# Patient Record
Sex: MALE | Race: WHITE | NOT HISPANIC OR LATINO | Employment: UNEMPLOYED | URBAN - METROPOLITAN AREA
[De-identification: names, ages, dates, MRNs, and addresses within clinical notes are randomized per-mention and may not be internally consistent; named-entity substitution may affect disease eponyms.]

---

## 2020-04-10 ENCOUNTER — HOSPITAL ENCOUNTER (OUTPATIENT)
Dept: RADIOLOGY | Facility: MEDICAL CENTER | Age: 30
End: 2020-04-10
Attending: STUDENT IN AN ORGANIZED HEALTH CARE EDUCATION/TRAINING PROGRAM
Payer: COMMERCIAL

## 2020-04-10 DIAGNOSIS — H51.22 INTERNUCLEAR OPHTHALMOPLEGIA OF LEFT EYE: ICD-10-CM

## 2020-04-10 PROCEDURE — A9576 INJ PROHANCE MULTIPACK: HCPCS | Performed by: STUDENT IN AN ORGANIZED HEALTH CARE EDUCATION/TRAINING PROGRAM

## 2020-04-10 PROCEDURE — 700117 HCHG RX CONTRAST REV CODE 255: Performed by: STUDENT IN AN ORGANIZED HEALTH CARE EDUCATION/TRAINING PROGRAM

## 2020-04-10 PROCEDURE — 70553 MRI BRAIN STEM W/O & W/DYE: CPT

## 2020-04-10 RX ADMIN — GADOTERIDOL 20 ML: 279.3 INJECTION, SOLUTION INTRAVENOUS at 08:20

## 2020-05-05 ENCOUNTER — HOSPITAL ENCOUNTER (OUTPATIENT)
Dept: RADIOLOGY | Facility: MEDICAL CENTER | Age: 30
End: 2020-05-05
Attending: PSYCHIATRY & NEUROLOGY
Payer: COMMERCIAL

## 2020-05-05 DIAGNOSIS — H51.22 INO (INTERNUCLEAR OPHTHALMOPLEGIA), LEFT: ICD-10-CM

## 2020-05-05 PROCEDURE — 700117 HCHG RX CONTRAST REV CODE 255: Performed by: PSYCHIATRY & NEUROLOGY

## 2020-05-05 PROCEDURE — 70543 MRI ORBT/FAC/NCK W/O &W/DYE: CPT

## 2020-05-05 PROCEDURE — A9576 INJ PROHANCE MULTIPACK: HCPCS | Performed by: PSYCHIATRY & NEUROLOGY

## 2020-05-05 RX ADMIN — GADOTERIDOL 20 ML: 279.3 INJECTION, SOLUTION INTRAVENOUS at 17:31

## 2020-05-06 ENCOUNTER — HOSPITAL ENCOUNTER (OUTPATIENT)
Dept: RADIOLOGY | Facility: MEDICAL CENTER | Age: 30
End: 2020-05-06
Attending: STUDENT IN AN ORGANIZED HEALTH CARE EDUCATION/TRAINING PROGRAM
Payer: COMMERCIAL

## 2020-05-06 DIAGNOSIS — R74.01 ELEVATED ALANINE AMINOTRANSFERASE (ALT) LEVEL: ICD-10-CM

## 2020-05-06 PROCEDURE — 76705 ECHO EXAM OF ABDOMEN: CPT

## 2020-08-12 ENCOUNTER — OUTPATIENT INFUSION SERVICES (OUTPATIENT)
Dept: ONCOLOGY | Facility: MEDICAL CENTER | Age: 30
End: 2020-08-12
Attending: PSYCHIATRY & NEUROLOGY
Payer: COMMERCIAL

## 2020-08-12 VITALS
BODY MASS INDEX: 32.22 KG/M2 | HEIGHT: 72 IN | DIASTOLIC BLOOD PRESSURE: 87 MMHG | RESPIRATION RATE: 18 BRPM | OXYGEN SATURATION: 96 % | WEIGHT: 237.88 LBS | TEMPERATURE: 98 F | SYSTOLIC BLOOD PRESSURE: 141 MMHG | HEART RATE: 96 BPM

## 2020-08-12 DIAGNOSIS — G40.909 NONINTRACTABLE EPILEPSY WITHOUT STATUS EPILEPTICUS, UNSPECIFIED EPILEPSY TYPE (HCC): ICD-10-CM

## 2020-08-12 PROCEDURE — 700105 HCHG RX REV CODE 258: Performed by: PSYCHIATRY & NEUROLOGY

## 2020-08-12 PROCEDURE — 700111 HCHG RX REV CODE 636 W/ 250 OVERRIDE (IP): Performed by: PSYCHIATRY & NEUROLOGY

## 2020-08-12 PROCEDURE — 96365 THER/PROPH/DIAG IV INF INIT: CPT

## 2020-08-12 RX ADMIN — SODIUM CHLORIDE 1000 MG: 9 INJECTION, SOLUTION INTRAVENOUS at 16:15

## 2020-08-13 ENCOUNTER — OUTPATIENT INFUSION SERVICES (OUTPATIENT)
Dept: ONCOLOGY | Facility: MEDICAL CENTER | Age: 30
End: 2020-08-13
Attending: PSYCHIATRY & NEUROLOGY
Payer: COMMERCIAL

## 2020-08-13 VITALS
HEIGHT: 72 IN | WEIGHT: 240.3 LBS | OXYGEN SATURATION: 97 % | SYSTOLIC BLOOD PRESSURE: 133 MMHG | DIASTOLIC BLOOD PRESSURE: 88 MMHG | TEMPERATURE: 98.1 F | HEART RATE: 97 BPM | BODY MASS INDEX: 32.55 KG/M2 | RESPIRATION RATE: 17 BRPM

## 2020-08-13 DIAGNOSIS — H51.22 INTERNUCLEAR OPHTHALMOPLEGIA OF LEFT EYE: ICD-10-CM

## 2020-08-13 PROCEDURE — 700111 HCHG RX REV CODE 636 W/ 250 OVERRIDE (IP): Performed by: PSYCHIATRY & NEUROLOGY

## 2020-08-13 PROCEDURE — 700105 HCHG RX REV CODE 258: Performed by: PSYCHIATRY & NEUROLOGY

## 2020-08-13 PROCEDURE — 96365 THER/PROPH/DIAG IV INF INIT: CPT

## 2020-08-13 RX ADMIN — SODIUM CHLORIDE 1000 MG: 9 INJECTION, SOLUTION INTRAVENOUS at 16:15

## 2020-08-13 NOTE — PROGRESS NOTES
arrives for day #1/3 of solumedrol to treat Optic neuritis. Education done on solumedrol and possible side effects.Florentino given informational handout on the drug. Solumedrol infused over 1 Hr., well tolerated, no S/S of reaction observed or expressed. PIV flushed, heplocked and wrapped in gauze and coban. Florentino DC'd home in good  condition with  alone and in NAD. Florentino returns tomorrow for day #2. Appointment confirm for next treatment.

## 2020-08-14 ENCOUNTER — OUTPATIENT INFUSION SERVICES (OUTPATIENT)
Dept: ONCOLOGY | Facility: MEDICAL CENTER | Age: 30
End: 2020-08-14
Attending: PSYCHIATRY & NEUROLOGY
Payer: COMMERCIAL

## 2020-08-14 VITALS
HEIGHT: 72 IN | BODY MASS INDEX: 32.7 KG/M2 | WEIGHT: 241.4 LBS | TEMPERATURE: 98.1 F | RESPIRATION RATE: 18 BRPM | OXYGEN SATURATION: 97 % | HEART RATE: 74 BPM | SYSTOLIC BLOOD PRESSURE: 129 MMHG | DIASTOLIC BLOOD PRESSURE: 74 MMHG

## 2020-08-14 DIAGNOSIS — H51.22 INTERNUCLEAR OPHTHALMOPLEGIA OF LEFT EYE: ICD-10-CM

## 2020-08-14 PROCEDURE — 700105 HCHG RX REV CODE 258: Performed by: PSYCHIATRY & NEUROLOGY

## 2020-08-14 PROCEDURE — 96365 THER/PROPH/DIAG IV INF INIT: CPT

## 2020-08-14 PROCEDURE — 700111 HCHG RX REV CODE 636 W/ 250 OVERRIDE (IP): Performed by: PSYCHIATRY & NEUROLOGY

## 2020-08-14 RX ADMIN — SODIUM CHLORIDE 1000 MG: 9 INJECTION, SOLUTION INTRAVENOUS at 16:44

## 2020-08-14 NOTE — PROGRESS NOTES
Pt arrived to [facility], ambulatory, for day 2 solumedrol. Pt voices no complaints. PIV (placed 8/12) flushed per policy, IV infiltrated. PIV removed. New PIV placed in the R-FA, positive blood return noted. Solumedrol infused with no s/sx of adverse reaction. PIV flushed and removed per pt request. Pt left [facility] with no s/sx of distress. Follow up appointment confirmed.

## 2020-08-15 NOTE — PROGRESS NOTES
Pt returns to [facility] for day 3 of 3 of Solumedrol.  Pt reports he woke up with heartburn this morning.  Pt took water with baking soda and symptoms were relieved.  PIV established to L-AC with positive blood return.  Solu-medrol infused over 1 hour.  PIV flushed and site removed.  Pt dc home to self care.

## 2022-05-18 ENCOUNTER — APPOINTMENT (OUTPATIENT)
Dept: RADIOLOGY | Facility: MEDICAL CENTER | Age: 32
DRG: 100 | End: 2022-05-18
Attending: EMERGENCY MEDICINE
Payer: COMMERCIAL

## 2022-05-18 ENCOUNTER — HOSPITAL ENCOUNTER (INPATIENT)
Facility: MEDICAL CENTER | Age: 32
LOS: 6 days | DRG: 100 | End: 2022-05-24
Attending: EMERGENCY MEDICINE | Admitting: INTERNAL MEDICINE
Payer: COMMERCIAL

## 2022-05-18 ENCOUNTER — APPOINTMENT (OUTPATIENT)
Dept: RADIOLOGY | Facility: MEDICAL CENTER | Age: 32
DRG: 100 | End: 2022-05-18
Attending: INTERNAL MEDICINE
Payer: COMMERCIAL

## 2022-05-18 DIAGNOSIS — G40.901 STATUS EPILEPTICUS (HCC): ICD-10-CM

## 2022-05-18 DIAGNOSIS — T79.6XXA TRAUMATIC RHABDOMYOLYSIS, INITIAL ENCOUNTER (HCC): ICD-10-CM

## 2022-05-18 DIAGNOSIS — S43.021A POSTERIOR DISLOCATION OF RIGHT SHOULDER JOINT, INITIAL ENCOUNTER: ICD-10-CM

## 2022-05-18 DIAGNOSIS — D72.829 LEUKOCYTOSIS, UNSPECIFIED TYPE: ICD-10-CM

## 2022-05-18 DIAGNOSIS — G40.909 NONINTRACTABLE EPILEPSY WITHOUT STATUS EPILEPTICUS, UNSPECIFIED EPILEPSY TYPE (HCC): ICD-10-CM

## 2022-05-18 DIAGNOSIS — E87.20 LACTIC ACIDOSIS: ICD-10-CM

## 2022-05-18 DIAGNOSIS — E86.0 DEHYDRATION: ICD-10-CM

## 2022-05-18 DIAGNOSIS — R56.9 SEIZURE-LIKE ACTIVITY (HCC): ICD-10-CM

## 2022-05-18 PROBLEM — S43.004A DISLOCATION OF RIGHT SHOULDER JOINT: Status: ACTIVE | Noted: 2022-05-18

## 2022-05-18 PROBLEM — Z92.89 HISTORY OF MECHANICAL VENTILATION: Status: ACTIVE | Noted: 2022-05-18

## 2022-05-18 PROBLEM — I46.9 CARDIAC ARREST (HCC): Status: ACTIVE | Noted: 2022-05-18

## 2022-05-18 LAB
ALBUMIN SERPL BCP-MCNC: 4.8 G/DL (ref 3.2–4.9)
ALBUMIN/GLOB SERPL: 1.7 G/DL
ALP SERPL-CCNC: 107 U/L (ref 30–99)
ALT SERPL-CCNC: 87 U/L (ref 2–50)
AMPHET UR QL SCN: NEGATIVE
ANION GAP SERPL CALC-SCNC: 17 MMOL/L (ref 7–16)
ANION GAP SERPL CALC-SCNC: 19 MMOL/L (ref 7–16)
ANION GAP SERPL CALC-SCNC: 33 MMOL/L (ref 7–16)
ANISOCYTOSIS BLD QL SMEAR: ABNORMAL
APPEARANCE UR: CLEAR
AST SERPL-CCNC: 44 U/L (ref 12–45)
BACTERIA #/AREA URNS HPF: NEGATIVE /HPF
BARBITURATES UR QL SCN: NEGATIVE
BASE EXCESS BLDA CALC-SCNC: -10 MMOL/L (ref -4–3)
BASOPHILS # BLD AUTO: 0 % (ref 0–1.8)
BASOPHILS # BLD: 0 K/UL (ref 0–0.12)
BENZODIAZ UR QL SCN: NEGATIVE
BILIRUB SERPL-MCNC: 0.2 MG/DL (ref 0.1–1.5)
BILIRUB UR QL STRIP.AUTO: NEGATIVE
BODY TEMPERATURE: ABNORMAL DEGREES
BREATHS SETTING VENT: 24
BUN SERPL-MCNC: 12 MG/DL (ref 8–22)
BUN SERPL-MCNC: 16 MG/DL (ref 8–22)
BUN SERPL-MCNC: 16 MG/DL (ref 8–22)
BURR CELLS/RBC NFR CSF MANUAL: 0 %
BZE UR QL SCN: NEGATIVE
CA-I SERPL-SCNC: 1 MMOL/L (ref 1.1–1.3)
CALCIUM SERPL-MCNC: 6.3 MG/DL (ref 8.5–10.5)
CALCIUM SERPL-MCNC: 7.6 MG/DL (ref 8.5–10.5)
CALCIUM SERPL-MCNC: 9.7 MG/DL (ref 8.5–10.5)
CANNABINOIDS UR QL SCN: NEGATIVE
CHLORIDE SERPL-SCNC: 104 MMOL/L (ref 96–112)
CHLORIDE SERPL-SCNC: 108 MMOL/L (ref 96–112)
CHLORIDE SERPL-SCNC: 99 MMOL/L (ref 96–112)
CK SERPL-CCNC: 233 U/L (ref 0–154)
CLARITY CSF: CLEAR
CO2 BLDA-SCNC: 18 MMOL/L (ref 20–33)
CO2 SERPL-SCNC: 10 MMOL/L (ref 20–33)
CO2 SERPL-SCNC: 15 MMOL/L (ref 20–33)
CO2 SERPL-SCNC: 8 MMOL/L (ref 20–33)
COLOR CSF: COLORLESS
COLOR SPUN CSF: COLORLESS
COLOR UR: YELLOW
CREAT SERPL-MCNC: 0.96 MG/DL (ref 0.5–1.4)
CREAT SERPL-MCNC: 1.34 MG/DL (ref 0.5–1.4)
CREAT SERPL-MCNC: 1.65 MG/DL (ref 0.5–1.4)
CRP SERPL HS-MCNC: <0.3 MG/DL (ref 0–0.75)
DELSYS IDSYS: ABNORMAL
EKG IMPRESSION: NORMAL
END TIDAL CARBON DIOXIDE IECO2: 36 MMHG
EOSINOPHIL # BLD AUTO: 0.32 K/UL (ref 0–0.51)
EOSINOPHIL NFR BLD: 0.9 % (ref 0–6.9)
EPI CELLS #/AREA URNS HPF: NEGATIVE /HPF
ERYTHROCYTE [DISTWIDTH] IN BLOOD BY AUTOMATED COUNT: 38.6 FL (ref 35.9–50)
ERYTHROCYTE [SEDIMENTATION RATE] IN BLOOD BY WESTERGREN METHOD: 5 MM/HOUR (ref 0–20)
ETHANOL BLD-MCNC: <10.1 MG/DL
GFR SERPLBLD CREATININE-BSD FMLA CKD-EPI: 108 ML/MIN/1.73 M 2
GFR SERPLBLD CREATININE-BSD FMLA CKD-EPI: 56 ML/MIN/1.73 M 2
GFR SERPLBLD CREATININE-BSD FMLA CKD-EPI: 72 ML/MIN/1.73 M 2
GLOBULIN SER CALC-MCNC: 2.9 G/DL (ref 1.9–3.5)
GLUCOSE BLD STRIP.AUTO-MCNC: 140 MG/DL (ref 65–99)
GLUCOSE BLD STRIP.AUTO-MCNC: 164 MG/DL (ref 65–99)
GLUCOSE SERPL-MCNC: 100 MG/DL (ref 65–99)
GLUCOSE SERPL-MCNC: 111 MG/DL (ref 65–99)
GLUCOSE SERPL-MCNC: 238 MG/DL (ref 65–99)
GLUCOSE UR STRIP.AUTO-MCNC: NEGATIVE MG/DL
GRAN CASTS #/AREA URNS LPF: ABNORMAL /LPF
HCO3 BLDA-SCNC: 17 MMOL/L (ref 17–25)
HCT VFR BLD AUTO: 48.4 % (ref 42–52)
HGB BLD-MCNC: 16.3 G/DL (ref 14–18)
HOROWITZ INDEX BLDA+IHG-RTO: 288 MM[HG]
KETONES UR STRIP.AUTO-MCNC: ABNORMAL MG/DL
LACTATE BLD-SCNC: 10.7 MMOL/L (ref 0.5–2)
LACTATE BLD-SCNC: 3.7 MMOL/L (ref 0.5–2)
LACTATE BLD-SCNC: 7.2 MMOL/L (ref 0.5–2)
LEUKOCYTE ESTERASE UR QL STRIP.AUTO: NEGATIVE
LIPASE SERPL-CCNC: 36 U/L (ref 11–82)
LYMPHOCYTES # BLD AUTO: 7.14 K/UL (ref 1–4.8)
LYMPHOCYTES NFR BLD: 20 % (ref 22–41)
LYMPHOCYTES NFR CSF: 50 %
MACROCYTES BLD QL SMEAR: ABNORMAL
MANUAL DIFF BLD: NORMAL
MCH RBC QN AUTO: 30.5 PG (ref 27–33)
MCHC RBC AUTO-ENTMCNC: 33.7 G/DL (ref 33.7–35.3)
MCV RBC AUTO: 90.6 FL (ref 81.4–97.8)
METHADONE UR QL SCN: NEGATIVE
MICRO URNS: ABNORMAL
MICROCYTES BLD QL SMEAR: ABNORMAL
MODE IMODE: ABNORMAL
MONOCYTES # BLD AUTO: 0.32 K/UL (ref 0–0.85)
MONOCYTES NFR BLD AUTO: 0.9 % (ref 0–13.4)
MONONUC CELLS NFR CSF: 39 %
MORPHOLOGY BLD-IMP: NORMAL
NEUTROPHILS # BLD AUTO: 27.92 K/UL (ref 1.82–7.42)
NEUTROPHILS NFR BLD: 78.2 % (ref 44–72)
NEUTROPHILS NFR CSF: 11 %
NITRITE UR QL STRIP.AUTO: NEGATIVE
NRBC # BLD AUTO: 0 K/UL
NRBC BLD-RTO: 0 /100 WBC
O2/TOTAL GAS SETTING VFR VENT: 100 %
OPIATES UR QL SCN: NEGATIVE
OXYCODONE UR QL SCN: NEGATIVE
PCO2 BLDA: 39.1 MMHG (ref 26–37)
PCO2 TEMP ADJ BLDA: 38.2 MMHG (ref 26–37)
PCP UR QL SCN: NEGATIVE
PEEP END EXPIRATORY PRESSURE IPEEP: 8 CMH20
PH BLDA: 7.25 [PH] (ref 7.4–7.5)
PH TEMP ADJ BLDA: 7.25 [PH] (ref 7.4–7.5)
PH UR STRIP.AUTO: 5 [PH] (ref 5–8)
PLATELET # BLD AUTO: 452 K/UL (ref 164–446)
PLATELET BLD QL SMEAR: NORMAL
PMV BLD AUTO: 10.5 FL (ref 9–12.9)
PO2 BLDA: 288 MMHG (ref 64–87)
PO2 TEMP ADJ BLDA: 285 MMHG (ref 64–87)
POTASSIUM SERPL-SCNC: 4 MMOL/L (ref 3.6–5.5)
POTASSIUM SERPL-SCNC: 4.1 MMOL/L (ref 3.6–5.5)
POTASSIUM SERPL-SCNC: 4.2 MMOL/L (ref 3.6–5.5)
PROPOXYPH UR QL SCN: NEGATIVE
PROT SERPL-MCNC: 7.7 G/DL (ref 6–8.2)
PROT UR QL STRIP: 100 MG/DL
RBC # BLD AUTO: 5.34 M/UL (ref 4.7–6.1)
RBC # CSF: 15 CELLS/UL
RBC # URNS HPF: ABNORMAL /HPF
RBC BLD AUTO: PRESENT
RBC UR QL AUTO: ABNORMAL
SALICYLATES SERPL-MCNC: <1 MG/DL (ref 15–25)
SAO2 % BLDA: 100 % (ref 93–99)
SODIUM SERPL-SCNC: 133 MMOL/L (ref 135–145)
SODIUM SERPL-SCNC: 140 MMOL/L (ref 135–145)
SODIUM SERPL-SCNC: 140 MMOL/L (ref 135–145)
SP GR UR STRIP.AUTO: 1.02
SPECIMEN DRAWN FROM PATIENT: ABNORMAL
SPECIMEN VOL CSF: 8 ML
TIDAL VOLUME IVT: 430 ML
TRIGL SERPL-MCNC: 1002 MG/DL (ref 0–149)
TUBE # CSF: 3
TUBE # CSF: 4
UROBILINOGEN UR STRIP.AUTO-MCNC: 0.2 MG/DL
WBC # BLD AUTO: 35.7 K/UL (ref 4.8–10.8)
WBC # CSF: 1 CELLS/UL (ref 0–10)
WBC #/AREA URNS HPF: ABNORMAL /HPF

## 2022-05-18 PROCEDURE — 81001 URINALYSIS AUTO W/SCOPE: CPT

## 2022-05-18 PROCEDURE — C9803 HOPD COVID-19 SPEC COLLECT: HCPCS | Performed by: INTERNAL MEDICINE

## 2022-05-18 PROCEDURE — 87483 CNS DNA AMP PROBE TYPE 12-25: CPT

## 2022-05-18 PROCEDURE — 80307 DRUG TEST PRSMV CHEM ANLYZR: CPT

## 2022-05-18 PROCEDURE — 82962 GLUCOSE BLOOD TEST: CPT

## 2022-05-18 PROCEDURE — 87040 BLOOD CULTURE FOR BACTERIA: CPT

## 2022-05-18 PROCEDURE — 93005 ELECTROCARDIOGRAM TRACING: CPT | Performed by: EMERGENCY MEDICINE

## 2022-05-18 PROCEDURE — 74177 CT ABD & PELVIS W/CONTRAST: CPT | Mod: MG

## 2022-05-18 PROCEDURE — 303105 HCHG CATHETER EXTRA

## 2022-05-18 PROCEDURE — 700102 HCHG RX REV CODE 250 W/ 637 OVERRIDE(OP): Performed by: INTERNAL MEDICINE

## 2022-05-18 PROCEDURE — 99223 1ST HOSP IP/OBS HIGH 75: CPT | Mod: 57 | Performed by: ORTHOPAEDIC SURGERY

## 2022-05-18 PROCEDURE — 95822 EEG COMA OR SLEEP ONLY: CPT | Mod: 26 | Performed by: STUDENT IN AN ORGANIZED HEALTH CARE EDUCATION/TRAINING PROGRAM

## 2022-05-18 PROCEDURE — 94002 VENT MGMT INPAT INIT DAY: CPT

## 2022-05-18 PROCEDURE — 5A1935Z RESPIRATORY VENTILATION, LESS THAN 24 CONSECUTIVE HOURS: ICD-10-PCS | Performed by: EMERGENCY MEDICINE

## 2022-05-18 PROCEDURE — 96375 TX/PRO/DX INJ NEW DRUG ADDON: CPT

## 2022-05-18 PROCEDURE — 83690 ASSAY OF LIPASE: CPT

## 2022-05-18 PROCEDURE — 86694 HERPES SIMPLEX NES ANTBDY: CPT

## 2022-05-18 PROCEDURE — 83605 ASSAY OF LACTIC ACID: CPT

## 2022-05-18 PROCEDURE — 700111 HCHG RX REV CODE 636 W/ 250 OVERRIDE (IP): Performed by: PSYCHIATRY & NEUROLOGY

## 2022-05-18 PROCEDURE — 304538 HCHG NG TUBE

## 2022-05-18 PROCEDURE — C9254 INJECTION, LACOSAMIDE: HCPCS | Performed by: PSYCHIATRY & NEUROLOGY

## 2022-05-18 PROCEDURE — 82550 ASSAY OF CK (CPK): CPT

## 2022-05-18 PROCEDURE — 96365 THER/PROPH/DIAG IV INF INIT: CPT

## 2022-05-18 PROCEDURE — 94799 UNLISTED PULMONARY SVC/PX: CPT

## 2022-05-18 PROCEDURE — 0PSCXZZ REPOSITION RIGHT HUMERAL HEAD, EXTERNAL APPROACH: ICD-10-PCS | Performed by: ORTHOPAEDIC SURGERY

## 2022-05-18 PROCEDURE — 71045 X-RAY EXAM CHEST 1 VIEW: CPT

## 2022-05-18 PROCEDURE — 92950 HEART/LUNG RESUSCITATION CPR: CPT

## 2022-05-18 PROCEDURE — 96368 THER/DIAG CONCURRENT INF: CPT

## 2022-05-18 PROCEDURE — 73030 X-RAY EXAM OF SHOULDER: CPT | Mod: RT

## 2022-05-18 PROCEDURE — 4A00X4Z MEASUREMENT OF CENTRAL NERVOUS ELECTRICAL ACTIVITY, EXTERNAL APPROACH: ICD-10-PCS | Performed by: STUDENT IN AN ORGANIZED HEALTH CARE EDUCATION/TRAINING PROGRAM

## 2022-05-18 PROCEDURE — 85652 RBC SED RATE AUTOMATED: CPT

## 2022-05-18 PROCEDURE — 62270 DX LMBR SPI PNXR: CPT | Performed by: INTERNAL MEDICINE

## 2022-05-18 PROCEDURE — 99291 CRITICAL CARE FIRST HOUR: CPT | Mod: 25 | Performed by: INTERNAL MEDICINE

## 2022-05-18 PROCEDURE — 700111 HCHG RX REV CODE 636 W/ 250 OVERRIDE (IP): Performed by: EMERGENCY MEDICINE

## 2022-05-18 PROCEDURE — 23655 CLTX SHO DSLC W/MNPJ W/ANES: CPT | Mod: RT | Performed by: ORTHOPAEDIC SURGERY

## 2022-05-18 PROCEDURE — 87070 CULTURE OTHR SPECIMN AEROBIC: CPT

## 2022-05-18 PROCEDURE — 86140 C-REACTIVE PROTEIN: CPT

## 2022-05-18 PROCEDURE — 99291 CRITICAL CARE FIRST HOUR: CPT | Mod: 25 | Performed by: PSYCHIATRY & NEUROLOGY

## 2022-05-18 PROCEDURE — 87205 SMEAR GRAM STAIN: CPT

## 2022-05-18 PROCEDURE — 84157 ASSAY OF PROTEIN OTHER: CPT

## 2022-05-18 PROCEDURE — 302214 INTUBATION BOX: Performed by: EMERGENCY MEDICINE

## 2022-05-18 PROCEDURE — 73201 CT UPPER EXTREMITY W/DYE: CPT | Mod: RT,ME

## 2022-05-18 PROCEDURE — 51702 INSERT TEMP BLADDER CATH: CPT

## 2022-05-18 PROCEDURE — 36600 WITHDRAWAL OF ARTERIAL BLOOD: CPT

## 2022-05-18 PROCEDURE — 700117 HCHG RX CONTRAST REV CODE 255: Performed by: EMERGENCY MEDICINE

## 2022-05-18 PROCEDURE — 36415 COLL VENOUS BLD VENIPUNCTURE: CPT

## 2022-05-18 PROCEDURE — 700105 HCHG RX REV CODE 258: Performed by: INTERNAL MEDICINE

## 2022-05-18 PROCEDURE — 770022 HCHG ROOM/CARE - ICU (200)

## 2022-05-18 PROCEDURE — 87086 URINE CULTURE/COLONY COUNT: CPT

## 2022-05-18 PROCEDURE — 0240U HCHG SARS-COV-2 COVID-19 NFCT DS RESP RNA 3 TRGT MIC: CPT

## 2022-05-18 PROCEDURE — 009U3ZX DRAINAGE OF SPINAL CANAL, PERCUTANEOUS APPROACH, DIAGNOSTIC: ICD-10-PCS | Performed by: INTERNAL MEDICINE

## 2022-05-18 PROCEDURE — 0BH18EZ INSERTION OF ENDOTRACHEAL AIRWAY INTO TRACHEA, VIA NATURAL OR ARTIFICIAL OPENING ENDOSCOPIC: ICD-10-PCS | Performed by: EMERGENCY MEDICINE

## 2022-05-18 PROCEDURE — 82077 ASSAY SPEC XCP UR&BREATH IA: CPT

## 2022-05-18 PROCEDURE — 93005 ELECTROCARDIOGRAM TRACING: CPT

## 2022-05-18 PROCEDURE — 85007 BL SMEAR W/DIFF WBC COUNT: CPT

## 2022-05-18 PROCEDURE — 70450 CT HEAD/BRAIN W/O DYE: CPT | Mod: ME

## 2022-05-18 PROCEDURE — 99291 CRITICAL CARE FIRST HOUR: CPT

## 2022-05-18 PROCEDURE — 82945 GLUCOSE OTHER FLUID: CPT

## 2022-05-18 PROCEDURE — 80048 BASIC METABOLIC PNL TOTAL CA: CPT

## 2022-05-18 PROCEDURE — 85025 COMPLETE CBC W/AUTO DIFF WBC: CPT

## 2022-05-18 PROCEDURE — 95822 EEG COMA OR SLEEP ONLY: CPT | Performed by: STUDENT IN AN ORGANIZED HEALTH CARE EDUCATION/TRAINING PROGRAM

## 2022-05-18 PROCEDURE — 82803 BLOOD GASES ANY COMBINATION: CPT

## 2022-05-18 PROCEDURE — 700111 HCHG RX REV CODE 636 W/ 250 OVERRIDE (IP)

## 2022-05-18 PROCEDURE — 84478 ASSAY OF TRIGLYCERIDES: CPT

## 2022-05-18 PROCEDURE — 700101 HCHG RX REV CODE 250: Performed by: EMERGENCY MEDICINE

## 2022-05-18 PROCEDURE — 31500 INSERT EMERGENCY AIRWAY: CPT

## 2022-05-18 PROCEDURE — 700111 HCHG RX REV CODE 636 W/ 250 OVERRIDE (IP): Performed by: INTERNAL MEDICINE

## 2022-05-18 PROCEDURE — 82330 ASSAY OF CALCIUM: CPT

## 2022-05-18 PROCEDURE — 96367 TX/PROPH/DG ADDL SEQ IV INF: CPT

## 2022-05-18 PROCEDURE — 80053 COMPREHEN METABOLIC PANEL: CPT

## 2022-05-18 PROCEDURE — 5A12012 PERFORMANCE OF CARDIAC OUTPUT, SINGLE, MANUAL: ICD-10-PCS | Performed by: EMERGENCY MEDICINE

## 2022-05-18 PROCEDURE — 80179 DRUG ASSAY SALICYLATE: CPT

## 2022-05-18 PROCEDURE — 89051 BODY FLUID CELL COUNT: CPT

## 2022-05-18 PROCEDURE — 700105 HCHG RX REV CODE 258: Performed by: EMERGENCY MEDICINE

## 2022-05-18 PROCEDURE — 74018 RADEX ABDOMEN 1 VIEW: CPT

## 2022-05-18 RX ORDER — LORAZEPAM 2 MG/ML
4 INJECTION INTRAMUSCULAR ONCE
Status: COMPLETED | OUTPATIENT
Start: 2022-05-18 | End: 2022-05-18

## 2022-05-18 RX ORDER — DEXTROSE MONOHYDRATE 25 G/50ML
25 INJECTION, SOLUTION INTRAVENOUS
Status: DISCONTINUED | OUTPATIENT
Start: 2022-05-18 | End: 2022-05-19

## 2022-05-18 RX ORDER — FAMOTIDINE 20 MG/1
20 TABLET, FILM COATED ORAL EVERY 12 HOURS
Status: DISCONTINUED | OUTPATIENT
Start: 2022-05-18 | End: 2022-05-19

## 2022-05-18 RX ORDER — CLINDAMYCIN PHOSPHATE 900 MG/50ML
900 INJECTION, SOLUTION INTRAVENOUS ONCE
Status: COMPLETED | OUTPATIENT
Start: 2022-05-18 | End: 2022-05-18

## 2022-05-18 RX ORDER — BISACODYL 10 MG
10 SUPPOSITORY, RECTAL RECTAL
Status: DISCONTINUED | OUTPATIENT
Start: 2022-05-18 | End: 2022-05-22

## 2022-05-18 RX ORDER — AMOXICILLIN 250 MG
2 CAPSULE ORAL 2 TIMES DAILY
Status: DISCONTINUED | OUTPATIENT
Start: 2022-05-18 | End: 2022-05-22

## 2022-05-18 RX ORDER — LORAZEPAM 2 MG/ML
INJECTION INTRAMUSCULAR
Status: COMPLETED
Start: 2022-05-18 | End: 2022-05-18

## 2022-05-18 RX ORDER — LEVETIRACETAM 500 MG/5ML
1500 INJECTION, SOLUTION, CONCENTRATE INTRAVENOUS EVERY 12 HOURS
Status: DISCONTINUED | OUTPATIENT
Start: 2022-05-19 | End: 2022-05-21

## 2022-05-18 RX ORDER — ACETAMINOPHEN 325 MG/1
650 TABLET ORAL EVERY 4 HOURS PRN
Status: DISCONTINUED | OUTPATIENT
Start: 2022-05-18 | End: 2022-05-19

## 2022-05-18 RX ORDER — SODIUM CHLORIDE 9 MG/ML
1000 INJECTION, SOLUTION INTRAVENOUS ONCE
Status: COMPLETED | OUTPATIENT
Start: 2022-05-18 | End: 2022-05-18

## 2022-05-18 RX ORDER — SUCCINYLCHOLINE CHLORIDE 20 MG/ML
100 INJECTION INTRAMUSCULAR; INTRAVENOUS ONCE
Status: COMPLETED | OUTPATIENT
Start: 2022-05-18 | End: 2022-05-18

## 2022-05-18 RX ORDER — POLYETHYLENE GLYCOL 3350 17 G/17G
1 POWDER, FOR SOLUTION ORAL
Status: DISCONTINUED | OUTPATIENT
Start: 2022-05-18 | End: 2022-05-22

## 2022-05-18 RX ORDER — LEVETIRACETAM 500 MG/5ML
30 INJECTION, SOLUTION, CONCENTRATE INTRAVENOUS ONCE
Status: COMPLETED | OUTPATIENT
Start: 2022-05-18 | End: 2022-05-18

## 2022-05-18 RX ORDER — ENOXAPARIN SODIUM 100 MG/ML
40 INJECTION SUBCUTANEOUS DAILY
Status: DISCONTINUED | OUTPATIENT
Start: 2022-05-18 | End: 2022-05-20

## 2022-05-18 RX ORDER — SODIUM CHLORIDE 9 MG/ML
INJECTION, SOLUTION INTRAVENOUS CONTINUOUS
Status: DISCONTINUED | OUTPATIENT
Start: 2022-05-18 | End: 2022-05-20

## 2022-05-18 RX ORDER — PROPOFOL 10 MG/ML
40 INJECTION, EMULSION INTRAVENOUS ONCE
Status: COMPLETED | OUTPATIENT
Start: 2022-05-18 | End: 2022-05-18

## 2022-05-18 RX ORDER — LACOSAMIDE 10 MG/ML
200 INJECTION, SOLUTION INTRAVENOUS 2 TIMES DAILY
Status: DISCONTINUED | OUTPATIENT
Start: 2022-05-18 | End: 2022-05-21

## 2022-05-18 RX ORDER — SODIUM CHLORIDE, SODIUM LACTATE, POTASSIUM CHLORIDE, AND CALCIUM CHLORIDE .6; .31; .03; .02 G/100ML; G/100ML; G/100ML; G/100ML
30 INJECTION, SOLUTION INTRAVENOUS ONCE
Status: COMPLETED | OUTPATIENT
Start: 2022-05-18 | End: 2022-05-18

## 2022-05-18 RX ADMIN — LORAZEPAM 4 MG: 2 INJECTION INTRAMUSCULAR at 17:21

## 2022-05-18 RX ADMIN — IOHEXOL 80 ML: 350 INJECTION, SOLUTION INTRAVENOUS at 16:07

## 2022-05-18 RX ADMIN — LACOSAMIDE 200 MG: 10 INJECTION INTRAVENOUS at 19:17

## 2022-05-18 RX ADMIN — FAMOTIDINE 20 MG: 10 INJECTION, SOLUTION INTRAVENOUS at 18:24

## 2022-05-18 RX ADMIN — PIPERACILLIN AND TAZOBACTAM 4.5 G: 4; .5 INJECTION, POWDER, FOR SOLUTION INTRAVENOUS at 15:38

## 2022-05-18 RX ADMIN — FENTANYL CITRATE 100 MCG: 50 INJECTION, SOLUTION INTRAMUSCULAR; INTRAVENOUS at 21:15

## 2022-05-18 RX ADMIN — PROPOFOL 20 MCG/KG/MIN: 10 INJECTION, EMULSION INTRAVENOUS at 16:50

## 2022-05-18 RX ADMIN — INSULIN HUMAN 1 UNITS: 100 INJECTION, SOLUTION PARENTERAL at 18:22

## 2022-05-18 RX ADMIN — LORAZEPAM 4 MG: 2 INJECTION INTRAMUSCULAR; INTRAVENOUS at 17:21

## 2022-05-18 RX ADMIN — PROPOFOL 50 MCG/KG/MIN: 10 INJECTION, EMULSION INTRAVENOUS at 23:24

## 2022-05-18 RX ADMIN — CLINDAMYCIN PHOSPHATE 900 MG: 900 INJECTION, SOLUTION INTRAVENOUS at 17:00

## 2022-05-18 RX ADMIN — SODIUM CHLORIDE: 9 INJECTION, SOLUTION INTRAVENOUS at 18:37

## 2022-05-18 RX ADMIN — SUCCINYLCHOLINE CHLORIDE 100 MG: 20 INJECTION, SOLUTION INTRAMUSCULAR; INTRAVENOUS at 16:40

## 2022-05-18 RX ADMIN — PROPOFOL 60 MCG/KG/MIN: 10 INJECTION, EMULSION INTRAVENOUS at 17:53

## 2022-05-18 RX ADMIN — SODIUM CHLORIDE, POTASSIUM CHLORIDE, SODIUM LACTATE AND CALCIUM CHLORIDE 2994 ML: 600; 310; 30; 20 INJECTION, SOLUTION INTRAVENOUS at 16:14

## 2022-05-18 RX ADMIN — LEVETIRACETAM 2990 MG: 100 INJECTION, SOLUTION INTRAVENOUS at 17:10

## 2022-05-18 RX ADMIN — PROPOFOL 40 MG: 10 INJECTION, EMULSION INTRAVENOUS at 16:44

## 2022-05-18 RX ADMIN — VANCOMYCIN HYDROCHLORIDE 2500 MG: 500 INJECTION, POWDER, LYOPHILIZED, FOR SOLUTION INTRAVENOUS at 16:14

## 2022-05-18 RX ADMIN — PROPOFOL 80 MCG/KG/MIN: 10 INJECTION, EMULSION INTRAVENOUS at 21:15

## 2022-05-18 RX ADMIN — PROPOFOL 60 MCG/KG/MIN: 10 INJECTION, EMULSION INTRAVENOUS at 19:17

## 2022-05-18 RX ADMIN — ENOXAPARIN SODIUM 40 MG: 40 INJECTION SUBCUTANEOUS at 18:24

## 2022-05-18 RX ADMIN — SODIUM CHLORIDE 1000 ML: 9 INJECTION, SOLUTION INTRAVENOUS at 14:46

## 2022-05-18 RX ADMIN — LORAZEPAM: 2 INJECTION INTRAMUSCULAR; INTRAVENOUS at 16:45

## 2022-05-18 ASSESSMENT — LIFESTYLE VARIABLES
TOTAL SCORE: 0
HAVE PEOPLE ANNOYED YOU BY CRITICIZING YOUR DRINKING: NO
TOTAL SCORE: 0
HAVE YOU EVER FELT YOU SHOULD CUT DOWN ON YOUR DRINKING: NO
EVER HAD A DRINK FIRST THING IN THE MORNING TO STEADY YOUR NERVES TO GET RID OF A HANGOVER: NO
TOTAL SCORE: 0
DO YOU DRINK ALCOHOL: NO
CONSUMPTION TOTAL: INCOMPLETE
EVER FELT BAD OR GUILTY ABOUT YOUR DRINKING: NO

## 2022-05-18 NOTE — ED NOTES
Moises from Lab called with critical result of Lactic at 7.3. Critical lab result read back to .   Dr. Pickett notified of critical lab result at 1515.  Critical lab result read back by Dr. Pickett.

## 2022-05-18 NOTE — ED PROVIDER NOTES
"ED Provider Note  CHIEF COMPLAINT  Chief Complaint   Patient presents with   • Seizure       HPI  Florentino Ny is a 32 y.o. male with a history of seizure disorder who presents complaining of seizure.    Patient states he believes he had a seizure.  Per EMS who spoke with the patient's boyfriend, the patient was found slumped over the toilet when the partner arrived home from work today.  No apparent injuries. During transport, the patient had a 1 minute tonic-clonic seizure witnessed by EMS.    Patient reports right shoulder pain that he awoke with this morning.  He denies any chronic shoulder problems or injuries.  He is unsure if he had a seizure overnight.  He denies weakness and numbness.  He denies fever, chills, signs of infection that may have triggered a seizure.  He denies sleep deprivation, recent head injury, vomiting, headaches.    Patient states his last seizure was \"a long time ago.\"  He states he takes his Keppra  mg x 2 every morning and nightly as prescribed and has not missed any doses recently.  Patient sees Dr. Marquez from neurology.      ALLERGIES  No Known Allergies    CURRENT MEDICATIONS  Home Medications     Reviewed by Hellen Ceja (Pharmacy Tech) on 05/18/22 at 1552  Med List Status: Complete   Medication Last Dose Status   Calcium Carbonate-Vit D-Min (CALCIUM 1200 PO) 5/18/2022 Active   Levetiracetam (KEPPRA XR) 750 MG TABLET SR 24 HR 5/18/2022 Active   multivitamin (THERAGRAN) TABS 5/18/2022 Active                PAST MEDICAL HISTORY   has a past medical history of Seizure.    SURGICAL HISTORY  patient denies any surgical history    SOCIAL HISTORY  Social History     Tobacco Use   • Smoking status: Not on file   • Smokeless tobacco: Not on file   Substance and Sexual Activity   • Alcohol use: Not on file   • Drug use: Not on file   • Sexual activity: Not on file     Pt lives with same sex partner, Will.  Pt denies IVDU    Family Hx:  denies      REVIEW OF " "SYSTEMS  See HPI for further details.  All other systems are negative except as above in HPI.    PHYSICAL EXAM  VITAL SIGNS: /70   Pulse 88   Temp 36.8 °C (98.2 °F) (Temporal)   Resp (!) 22   Ht 1.88 m (6' 2\")   Wt 99.8 kg (220 lb)   SpO2 94%   BMI 28.25 kg/m²    General:  WDWN male, nontoxic appearing; answers questions appropriately; V/S as above; tachycardic, afebrile  Skin: warm and dry; good color; no rash  HEENT: NCAT; EOMs intact; PERRL; no scleral icterus; OP clear, no oral trauma  Neck: FROM; no meningismus, no LAD  Cardiovascular: Regular heart rate and rhythm.  No murmurs, rubs, or gallops; pulses 2+ bilaterally radially and DP areas  Lungs: Clear to auscultation with good air movement bilaterally.  No wheezes, rhonchi, or rales.   Abdomen: BS present; soft; NTND; no rebound, guarding, or rigidity.  No organomegaly or pulsatile mass; no CVAT; no mottling  Extremities: No crepitus, erythema, or warmth over the right shoulder; there are 2 linear abrasions no pedal edema; neg Abdias's  Neurologic: CNs III-XII grossly intact; speech clear; distal sensation intact; strength 5/5 UE/LEs  Psychiatric: appropriate affect, normal mood    LABS  Results for orders placed or performed during the hospital encounter of 05/18/22   CBC WITH DIFFERENTIAL   Result Value Ref Range    WBC 35.7 (H) 4.8 - 10.8 K/uL    RBC 5.34 4.70 - 6.10 M/uL    Hemoglobin 16.3 14.0 - 18.0 g/dL    Hematocrit 48.4 42.0 - 52.0 %    MCV 90.6 81.4 - 97.8 fL    MCH 30.5 27.0 - 33.0 pg    MCHC 33.7 33.7 - 35.3 g/dL    RDW 38.6 35.9 - 50.0 fL    Platelet Count 452 (H) 164 - 446 K/uL    MPV 10.5 9.0 - 12.9 fL    Neutrophils-Polys 78.20 (H) 44.00 - 72.00 %    Lymphocytes 20.00 (L) 22.00 - 41.00 %    Monocytes 0.90 0.00 - 13.40 %    Eosinophils 0.90 0.00 - 6.90 %    Basophils 0.00 0.00 - 1.80 %    Nucleated RBC 0.00 /100 WBC    Neutrophils (Absolute) 27.92 (H) 1.82 - 7.42 K/uL    Lymphs (Absolute) 7.14 (H) 1.00 - 4.80 K/uL    Monos " (Absolute) 0.32 0.00 - 0.85 K/uL    Eos (Absolute) 0.32 0.00 - 0.51 K/uL    Baso (Absolute) 0.00 0.00 - 0.12 K/uL    NRBC (Absolute) 0.00 K/uL    Anisocytosis 1+     Macrocytosis 1+     Microcytosis 1+    CMP   Result Value Ref Range    Sodium 140 135 - 145 mmol/L    Potassium 4.1 3.6 - 5.5 mmol/L    Chloride 99 96 - 112 mmol/L    Co2 8 (LL) 20 - 33 mmol/L    Anion Gap 33.0 (H) 7.0 - 16.0    Glucose 238 (H) 65 - 99 mg/dL    Bun 16 8 - 22 mg/dL    Creatinine 1.34 0.50 - 1.40 mg/dL    Calcium 9.7 8.5 - 10.5 mg/dL    AST(SGOT) 44 12 - 45 U/L    ALT(SGPT) 87 (H) 2 - 50 U/L    Alkaline Phosphatase 107 (H) 30 - 99 U/L    Total Bilirubin 0.2 0.1 - 1.5 mg/dL    Albumin 4.8 3.2 - 4.9 g/dL    Total Protein 7.7 6.0 - 8.2 g/dL    Globulin 2.9 1.9 - 3.5 g/dL    A-G Ratio 1.7 g/dL   DIAGNOSTIC ALCOHOL   Result Value Ref Range    Diagnostic Alcohol <10.1 <10.1 mg/dL   URINE DRUG SCREEN   Result Value Ref Range    Amphetamines Urine Negative Negative    Barbiturates Negative Negative    Benzodiazepines Negative Negative    Cocaine Metabolite Negative Negative    Methadone Negative Negative    Opiates Negative Negative    Oxycodone Negative Negative    Phencyclidine -Pcp Negative Negative    Propoxyphene Negative Negative    Cannabinoid Metab Negative Negative   ESTIMATED GFR   Result Value Ref Range    GFR (CKD-EPI) 72 >60 mL/min/1.73 m 2   LACTIC ACID   Result Value Ref Range    Lactic Acid 7.2 (HH) 0.5 - 2.0 mmol/L   DIFFERENTIAL MANUAL   Result Value Ref Range    Manual Diff Status PERFORMED    PERIPHERAL SMEAR REVIEW   Result Value Ref Range    Peripheral Smear Review see below    PLATELET ESTIMATE   Result Value Ref Range    Plt Estimation Increased    MORPHOLOGY   Result Value Ref Range    RBC Morphology Present    Sed Rate   Result Value Ref Range    Sed Rate Westergren 5 0 - 20 mm/hour   URINALYSIS    Specimen: Urine   Result Value Ref Range    Color Yellow     Character Clear     Specific Gravity 1.016 <1.035    Ph 5.0 5.0  - 8.0    Glucose Negative Negative mg/dL    Ketones Trace (A) Negative mg/dL    Protein 100 (A) Negative mg/dL    Bilirubin Negative Negative    Urobilinogen, Urine 0.2 Negative    Nitrite Negative Negative    Leukocyte Esterase Negative Negative    Occult Blood Moderate (A) Negative    Micro Urine Req Microscopic    CREATINE KINASE   Result Value Ref Range    CPK Total 233 (H) 0 - 154 U/L   LIPASE   Result Value Ref Range    Lipase 36 11 - 82 U/L   Salicylate   Result Value Ref Range    Salicylates, Quant. <1.0 (L) 15.0 - 25.0 mg/dL   URINE MICROSCOPIC (W/UA)   Result Value Ref Range    WBC 0-2 (A) /hpf    RBC 0-2 (A) /hpf    Bacteria Negative None /hpf    Epithelial Cells Negative /hpf    Granular Casts 3-5 (A) /lpf   CRP QUANTITIVE (NON-CARDIAC)   Result Value Ref Range    Stat C-Reactive Protein <0.30 0.00 - 0.75 mg/dL   EKG   Result Value Ref Range    Report       [facility] Emergency Dept.    Test Date:  2022  Pt Name:    ARCHIE ROSE              Department: ER  MRN:        [id number]                      Room:       BL 15  Gender:     Male                         Technician: [id number]  :        1990                   Requested By:ER TRIAGE PROTOCOL  Order #:    [id number]                    Reading MD: ALDO COPPOLA MD    Measurements  Intervals                                Axis  Rate:       110                          P:          47  NH:         137                          QRS:        85  QRSD:       94                           T:          12  QT:         348  QTc:        472    Interpretive Statements  Sinus tachycardia  Borderline prolonged QT interval  No previous ECG available for comparison  Electronically Signed On 2022 14:39:26 PDT by ALDO COPPOLA MD     POCT glucose device results   Result Value Ref Range    POC Glucose, Blood 140 (H) 65 - 99 mg/dL       IMAGING  CT-SHOULDER WITH PLUS RECONS RIGHT   Final Result      1.  Posterior shoulder  dislocation with associated comminuted reverse Hill-Sachs lesion. Multiple fracture fragments within the joint space.   2.  Likely tiny fracture fragment of the superior glenoid rim.   3.  Associated glenohumeral joint effusion with tiny focus of gas of uncertain source.      CT-ABDOMEN-PELVIS WITH   Final Result      No acute inflammation in the abdomen or pelvis.      DX-CHEST-PORTABLE (1 VIEW)   Final Result      No acute cardiac or pulmonary abnormalities are identified.      DX-SHOULDER 2+ RIGHT   Final Result      Calcification in the region of the lower glenohumeral joint recess could be a fracture fragment or could be related to synovial osteochondromatosis. Other inflammatory conditions such as pigmented villonodular synovitis might also cause this appearance.    Further assessment could be performed with CT or MRI as determined clinically.      CT-HEAD W/O   Final Result         NO ACUTE ABNORMALITIES ARE NOTED ON CT SCAN OF THE HEAD.               DX-CHEST-PORTABLE (1 VIEW)    (Results Pending)   DX-SHOULDER 2+ RIGHT    (Results Pending)         Intubation Procedure Note    Indication: Respiratory failure and airway compromise    Consent: Unable to be obtained due to the emergent nature of this procedure.    Medications Used: succinycholine 200mg intravenously    Procedure: The patient was placed in the appropriate position.  Cricoid pressure was not required.  Intubation was performed by indirect laryngoscopy using a bronchoscope and an 8.0 cuffed endotracheal tube.  The tube was then secured appropriately at a distance of 24 cm to the dental ridge.  Initial confirmation of placement included bilateral breath sounds, an end tidal CO2 detector, absence of sounds over the stomach, tube fogging, adequate chest rise, adequate pulse oximetry reading and improved skin color.  A chest x-ray to verify correct placement of the tube has been ordered but is still pending.    The patient tolerated the procedure  "well.     Complications: None      MEDICAL RECORD  I have reviewed patient's medical record and pertinent results are listed below.    COURSE & MEDICAL DECISION MAKING  I have reviewed any medical record information, laboratory studies and radiographic results as noted.    Florentino Ny is a 32 y.o. male who presents complaining of seizure witnessed by EMS.  Patient has a known seizure disorder but he is compliant with medications and has been sometime since he had a seizure.  He complains of right shoulder pain that he awoke with this morning.  Patient is afebrile and nontoxic-appearing.  Possibly postictal when I first interviewed him.    Appropriate PPE was worn at all times while interacting with the patient.    NS bolus was ordered for possible dehydration related to patient's CO2 of 8.  Lactic acid ordered.    After receiving the results of the CO2 of 8, I entered the room.  The patient's long-term partner's mother is now present at the bedside.  I asked her to step out while I talk to the patient. I advised the patient that this lab is sometimes abnormal in cases of severe dehydration or alcohol use.  He states he drinks rarely and socially only.  He has not had a drink \"in a long time.\"  He denies a history of alcohol withdrawal seizures.  He denies any recent vomiting and diarrhea.  He once again denies infectious symptoms.  Awaiting remainder of lab results.     3:13 PM  White blood cell count now resulted and found to be elevated to 35.7.  Patient has a neutrophilic predominance and lymphopenia.  Lactic acid is pending.  Blood cultures, urinalysis, and urine culture along with chest x-ray ordered.  Zosyn, vancomycin and clindamycin ordered for possible infections source.    3:15 PM  Lactic acid 7.3 .  Lactic acid panel ordered.  CT abdomen/pelvis ordered along with CT shoulder with contrast to evaluate for source of sepsis.    Paging orthopedics    4:54 PM  I was in the room advising the patient " and his partner, Will, regarding the CT scan results showing a posterior shoulder dislocation when the patient began seizing.  Ativan 2 mg IV was requested.  The patient then turned gray and blue.  No pulse was palpable and the patient was apneic.  CPR was initiated.  Patient received epi x1 and compression    Paging ICU    4:56 PM  I discussed the case with Dr. Gutierrez from the ICU who agrees to admit the patient.  Chest x-ray is pending for post intubation.  Right shoulder x-ray is pending for post reduction by Dr. Goel from orthopedics.     Paging neurology    5:22 PM  I discussed the case with Dr. Garcia from neurology who recommends EEG and will consult.      The total critical care time on this patient is 60 minutes, resuscitating patient, speaking with admitting physician and multiple consultants, and deciphering test results. This 60 minutes is exclusive of separately billable procedures.      IMPRESSION  1. Seizure-like activity (HCC)     2. Dehydration     3. Lactic acidosis     4. Leukocytosis, unspecified type     5. Posterior dislocation of right shoulder joint, initial encounter       Electronically signed by: Deyanira Pickett M.D., 5/18/2022 1:54 PM

## 2022-05-18 NOTE — ED TRIAGE NOTES
".  Chief Complaint   Patient presents with   • Seizure     Patient was found slumped over the toilet by his boyfriend. Friend called 911. Patient then had a one minute tonic-clonic seizure witnessed by EMS while en route. Patient has a history of epilepsy and reports his last seizure was in 2013. Patient reports he's been taking his keppra and has not missed any doses. Patient also complains of severe right arm/shoulder pain that he says started when he woke up today.     ./58   Pulse (!) 120   Temp 36.8 °C (98.2 °F) (Temporal)   Resp 20   Ht 1.88 m (6' 2\")   Wt 99.8 kg (220 lb)   SpO2 92%     "

## 2022-05-19 ENCOUNTER — APPOINTMENT (OUTPATIENT)
Dept: RADIOLOGY | Facility: MEDICAL CENTER | Age: 32
DRG: 100 | End: 2022-05-19
Attending: INTERNAL MEDICINE
Payer: COMMERCIAL

## 2022-05-19 ENCOUNTER — APPOINTMENT (OUTPATIENT)
Dept: CARDIOLOGY | Facility: MEDICAL CENTER | Age: 32
DRG: 100 | End: 2022-05-19
Attending: INTERNAL MEDICINE
Payer: COMMERCIAL

## 2022-05-19 PROBLEM — N17.9 AKI (ACUTE KIDNEY INJURY) (HCC): Status: ACTIVE | Noted: 2022-05-19

## 2022-05-19 PROBLEM — Z92.89 HISTORY OF MECHANICAL VENTILATION: Status: RESOLVED | Noted: 2022-05-18 | Resolved: 2022-05-19

## 2022-05-19 PROBLEM — M62.82 RHABDOMYOLYSIS: Status: ACTIVE | Noted: 2022-05-19

## 2022-05-19 PROBLEM — D72.829 LEUKOCYTOSIS (LEUCOCYTOSIS): Status: RESOLVED | Noted: 2022-05-18 | Resolved: 2022-05-19

## 2022-05-19 PROBLEM — J96.01 ACUTE RESPIRATORY FAILURE WITH HYPOXIA (HCC): Status: ACTIVE | Noted: 2022-05-19

## 2022-05-19 LAB
ANION GAP SERPL CALC-SCNC: 11 MMOL/L (ref 7–16)
BASE EXCESS BLDA CALC-SCNC: -4 MMOL/L (ref -4–3)
BASOPHILS # BLD AUTO: 0.3 % (ref 0–1.8)
BASOPHILS # BLD: 0.03 K/UL (ref 0–0.12)
BODY TEMPERATURE: ABNORMAL DEGREES
BREATHS SETTING VENT: 26
BUN SERPL-MCNC: 17 MG/DL (ref 8–22)
C GATTII+NEOFOR DNA CSF QL NAA+NON-PROBE: NOT DETECTED
CALCIUM SERPL-MCNC: 7.7 MG/DL (ref 8.5–10.5)
CHLORIDE SERPL-SCNC: 109 MMOL/L (ref 96–112)
CK SERPL-CCNC: 5174 U/L (ref 0–154)
CK SERPL-CCNC: ABNORMAL U/L (ref 0–154)
CMV DNA CSF QL NAA+NON-PROBE: NOT DETECTED
CO2 BLDA-SCNC: 21 MMOL/L (ref 20–33)
CO2 SERPL-SCNC: 19 MMOL/L (ref 20–33)
CREAT SERPL-MCNC: 1.94 MG/DL (ref 0.5–1.4)
DELSYS IDSYS: ABNORMAL
E COLI K1 DNA CSF QL NAA+NON-PROBE: NOT DETECTED
END TIDAL CARBON DIOXIDE IECO2: 32 MMHG
EOSINOPHIL # BLD AUTO: 0.01 K/UL (ref 0–0.51)
EOSINOPHIL NFR BLD: 0.1 % (ref 0–6.9)
ERYTHROCYTE [DISTWIDTH] IN BLOOD BY AUTOMATED COUNT: 37.9 FL (ref 35.9–50)
EV RNA CSF QL NAA+NON-PROBE: NOT DETECTED
GFR SERPLBLD CREATININE-BSD FMLA CKD-EPI: 46 ML/MIN/1.73 M 2
GLUCOSE CSF-MCNC: 100 MG/DL (ref 40–80)
GLUCOSE SERPL-MCNC: 103 MG/DL (ref 65–99)
GP B STREP DNA CSF QL NAA+NON-PROBE: NOT DETECTED
GRAM STN SPEC: NORMAL
HAEM INFLU DNA CSF QL NAA+NON-PROBE: NOT DETECTED
HCO3 BLDA-SCNC: 19.9 MMOL/L (ref 17–25)
HCT VFR BLD AUTO: 34.5 % (ref 42–52)
HGB BLD-MCNC: 11.9 G/DL (ref 14–18)
HHV6 DNA CSF QL NAA+NON-PROBE: NOT DETECTED
HOROWITZ INDEX BLDA+IHG-RTO: 325 MM[HG]
HSV1 DNA CSF QL NAA+NON-PROBE: NOT DETECTED
HSV2 DNA CSF QL NAA+NON-PROBE: NOT DETECTED
IMM GRANULOCYTES # BLD AUTO: 0.05 K/UL (ref 0–0.11)
IMM GRANULOCYTES NFR BLD AUTO: 0.4 % (ref 0–0.9)
L MONOCYTOG DNA CSF QL NAA+NON-PROBE: NOT DETECTED
LACTATE BLD-SCNC: 2.1 MMOL/L (ref 0.5–2)
LACTATE BLD-SCNC: 3.5 MMOL/L (ref 0.5–2)
LV EJECT FRACT  99904: 65
LV EJECT FRACT MOD 2C 99903: 62.94
LV EJECT FRACT MOD 4C 99902: 70.37
LV EJECT FRACT MOD BP 99901: 66.15
LYMPHOCYTES # BLD AUTO: 1 K/UL (ref 1–4.8)
LYMPHOCYTES NFR BLD: 8.5 % (ref 22–41)
MAGNESIUM SERPL-MCNC: 2.6 MG/DL (ref 1.5–2.5)
MCH RBC QN AUTO: 30.3 PG (ref 27–33)
MCHC RBC AUTO-ENTMCNC: 34.5 G/DL (ref 33.7–35.3)
MCV RBC AUTO: 87.8 FL (ref 81.4–97.8)
MODE IMODE: ABNORMAL
MONOCYTES # BLD AUTO: 1.08 K/UL (ref 0–0.85)
MONOCYTES NFR BLD AUTO: 9.2 % (ref 0–13.4)
N MEN DNA CSF QL NAA+NON-PROBE: NOT DETECTED
NEUTROPHILS # BLD AUTO: 9.59 K/UL (ref 1.82–7.42)
NEUTROPHILS NFR BLD: 81.5 % (ref 44–72)
NRBC # BLD AUTO: 0 K/UL
NRBC BLD-RTO: 0 /100 WBC
O2/TOTAL GAS SETTING VFR VENT: 40 %
PARECHOVIRUS A RNA CSF QL NAA+NON-PROBE: NOT DETECTED
PCO2 BLDA: 30.9 MMHG (ref 26–37)
PCO2 TEMP ADJ BLDA: 32.4 MMHG (ref 26–37)
PEEP END EXPIRATORY PRESSURE IPEEP: 8 CMH20
PH BLDA: 7.42 [PH] (ref 7.4–7.5)
PH TEMP ADJ BLDA: 7.4 [PH] (ref 7.4–7.5)
PHOSPHATE SERPL-MCNC: 3.8 MG/DL (ref 2.5–4.5)
PLATELET # BLD AUTO: 213 K/UL (ref 164–446)
PMV BLD AUTO: 10.1 FL (ref 9–12.9)
PO2 BLDA: 130 MMHG (ref 64–87)
PO2 TEMP ADJ BLDA: 137 MMHG (ref 64–87)
POTASSIUM SERPL-SCNC: 3.6 MMOL/L (ref 3.6–5.5)
PROT CSF-MCNC: 26 MG/DL (ref 15–45)
RBC # BLD AUTO: 3.93 M/UL (ref 4.7–6.1)
S PNEUM DNA CSF QL NAA+NON-PROBE: NOT DETECTED
SAO2 % BLDA: 99 % (ref 93–99)
SIGNIFICANT IND 70042: NORMAL
SITE SITE: NORMAL
SODIUM SERPL-SCNC: 139 MMOL/L (ref 135–145)
SOURCE SOURCE: NORMAL
SPECIMEN DRAWN FROM PATIENT: ABNORMAL
TIDAL VOLUME IVT: 430 ML
VZV DNA CSF QL NAA+NON-PROBE: NOT DETECTED
WBC # BLD AUTO: 11.8 K/UL (ref 4.8–10.8)

## 2022-05-19 PROCEDURE — 84100 ASSAY OF PHOSPHORUS: CPT

## 2022-05-19 PROCEDURE — A9270 NON-COVERED ITEM OR SERVICE: HCPCS | Performed by: INTERNAL MEDICINE

## 2022-05-19 PROCEDURE — 71045 X-RAY EXAM CHEST 1 VIEW: CPT

## 2022-05-19 PROCEDURE — 700102 HCHG RX REV CODE 250 W/ 637 OVERRIDE(OP): Performed by: INTERNAL MEDICINE

## 2022-05-19 PROCEDURE — 82550 ASSAY OF CK (CPK): CPT

## 2022-05-19 PROCEDURE — 770022 HCHG ROOM/CARE - ICU (200)

## 2022-05-19 PROCEDURE — 62270 DX LMBR SPI PNXR: CPT

## 2022-05-19 PROCEDURE — 85025 COMPLETE CBC W/AUTO DIFF WBC: CPT

## 2022-05-19 PROCEDURE — 82803 BLOOD GASES ANY COMBINATION: CPT

## 2022-05-19 PROCEDURE — 83605 ASSAY OF LACTIC ACID: CPT

## 2022-05-19 PROCEDURE — 700111 HCHG RX REV CODE 636 W/ 250 OVERRIDE (IP): Performed by: INTERNAL MEDICINE

## 2022-05-19 PROCEDURE — 36600 WITHDRAWAL OF ARTERIAL BLOOD: CPT

## 2022-05-19 PROCEDURE — 83735 ASSAY OF MAGNESIUM: CPT

## 2022-05-19 PROCEDURE — 99291 CRITICAL CARE FIRST HOUR: CPT | Performed by: INTERNAL MEDICINE

## 2022-05-19 PROCEDURE — 306637 HCHG MISC ORTHO ITEM RC 0274

## 2022-05-19 PROCEDURE — C9254 INJECTION, LACOSAMIDE: HCPCS | Performed by: PSYCHIATRY & NEUROLOGY

## 2022-05-19 PROCEDURE — 700111 HCHG RX REV CODE 636 W/ 250 OVERRIDE (IP): Performed by: PSYCHIATRY & NEUROLOGY

## 2022-05-19 PROCEDURE — 82962 GLUCOSE BLOOD TEST: CPT

## 2022-05-19 PROCEDURE — 93306 TTE W/DOPPLER COMPLETE: CPT

## 2022-05-19 PROCEDURE — 94799 UNLISTED PULMONARY SVC/PX: CPT

## 2022-05-19 PROCEDURE — 700105 HCHG RX REV CODE 258: Performed by: INTERNAL MEDICINE

## 2022-05-19 PROCEDURE — 93306 TTE W/DOPPLER COMPLETE: CPT | Mod: 26 | Performed by: INTERNAL MEDICINE

## 2022-05-19 PROCEDURE — 94003 VENT MGMT INPAT SUBQ DAY: CPT

## 2022-05-19 PROCEDURE — 80048 BASIC METABOLIC PNL TOTAL CA: CPT

## 2022-05-19 PROCEDURE — L3670 SO ACRO/CLAV CAN WEB PRE OTS: HCPCS

## 2022-05-19 PROCEDURE — 99233 SBSQ HOSP IP/OBS HIGH 50: CPT | Performed by: PSYCHIATRY & NEUROLOGY

## 2022-05-19 RX ORDER — ACETAMINOPHEN 325 MG/1
650 TABLET ORAL EVERY 4 HOURS PRN
Status: DISCONTINUED | OUTPATIENT
Start: 2022-05-19 | End: 2022-05-22

## 2022-05-19 RX ORDER — POTASSIUM CHLORIDE 7.45 MG/ML
10 INJECTION INTRAVENOUS
Status: COMPLETED | OUTPATIENT
Start: 2022-05-19 | End: 2022-05-19

## 2022-05-19 RX ADMIN — SODIUM CHLORIDE: 9 INJECTION, SOLUTION INTRAVENOUS at 20:31

## 2022-05-19 RX ADMIN — POTASSIUM CHLORIDE 10 MEQ: 7.46 INJECTION, SOLUTION INTRAVENOUS at 09:04

## 2022-05-19 RX ADMIN — ACETAMINOPHEN 650 MG: 325 TABLET ORAL at 21:03

## 2022-05-19 RX ADMIN — LEVETIRACETAM 1500 MG: 100 INJECTION, SOLUTION INTRAVENOUS at 06:00

## 2022-05-19 RX ADMIN — LACOSAMIDE 200 MG: 10 INJECTION INTRAVENOUS at 05:14

## 2022-05-19 RX ADMIN — PROPOFOL 50 MCG/KG/MIN: 10 INJECTION, EMULSION INTRAVENOUS at 03:18

## 2022-05-19 RX ADMIN — ACETAMINOPHEN 650 MG: 325 TABLET ORAL at 13:30

## 2022-05-19 RX ADMIN — LEVETIRACETAM 1500 MG: 100 INJECTION, SOLUTION INTRAVENOUS at 18:04

## 2022-05-19 RX ADMIN — POTASSIUM CHLORIDE 10 MEQ: 7.46 INJECTION, SOLUTION INTRAVENOUS at 07:58

## 2022-05-19 RX ADMIN — PROPOFOL 50 MCG/KG/MIN: 10 INJECTION, EMULSION INTRAVENOUS at 05:44

## 2022-05-19 RX ADMIN — SENNOSIDES AND DOCUSATE SODIUM 2 TABLET: 50; 8.6 TABLET ORAL at 18:05

## 2022-05-19 RX ADMIN — SODIUM CHLORIDE: 9 INJECTION, SOLUTION INTRAVENOUS at 05:44

## 2022-05-19 RX ADMIN — ENOXAPARIN SODIUM 40 MG: 40 INJECTION SUBCUTANEOUS at 18:05

## 2022-05-19 RX ADMIN — FAMOTIDINE 20 MG: 10 INJECTION, SOLUTION INTRAVENOUS at 05:14

## 2022-05-19 RX ADMIN — LACOSAMIDE 200 MG: 10 INJECTION INTRAVENOUS at 18:04

## 2022-05-19 ASSESSMENT — PATIENT HEALTH QUESTIONNAIRE - PHQ9
SUM OF ALL RESPONSES TO PHQ9 QUESTIONS 1 AND 2: 0
1. LITTLE INTEREST OR PLEASURE IN DOING THINGS: NOT AT ALL

## 2022-05-19 ASSESSMENT — COPD QUESTIONNAIRES
COPD SCREENING SCORE: 0
DO YOU EVER COUGH UP ANY MUCUS OR PHLEGM?: NO/ONLY WITH OCCASIONAL COLDS OR INFECTIONS
DURING THE PAST 4 WEEKS HOW MUCH DID YOU FEEL SHORT OF BREATH: NONE/LITTLE OF THE TIME
HAVE YOU SMOKED AT LEAST 100 CIGARETTES IN YOUR ENTIRE LIFE: NO/DON'T KNOW

## 2022-05-19 ASSESSMENT — PAIN DESCRIPTION - PAIN TYPE
TYPE: ACUTE PAIN

## 2022-05-19 NOTE — H&P
"Pulmonary History & Physical Note    Date of Service  5/18/2022    Primary Care Physician  Pcp Not In Computer       Code Status  Full Code    Chief Complaint  Chief Complaint   Patient presents with   • Seizure       History of Presenting Illness  Florentino Ny is a 32 y.o. male who presented 5/18/2022 with known hx of seizures on Keppra 1500 mg bid with no breakthrough seizures since 2013.  Pt woke up with right shoulder pain and was his only complaint before his partner came home today and found him slumped in the bathroom. EMS called and brought to the ER and had another tonic clonic seizure 1 min.   In the Er while he was being assessed for his shoulder he had a tonic clonic seizure and given 2 mg of ativan and became cyanotic and had PEA cardiac arrest requiring one round of epinephrine and 2 mins CPR with ROSC. Intubated,    Initial labs were HCO3 of 8 and wbc of 35 and lactic of 7.5    Due to elevated lactic acid CT abdoman pelvis done negative for abnormalities  CT head negative for bleed or abnormalities  CT of right shoulder showed dislocation with comminuted revere Hill Sachs lesion with multiple fracture fragments in the joint space. Also \"Associated glenohumeral joint effusion with tiny focus of gas of uncertain source.\"    Dr. Goel from Orthopedics reduced the right shoulder    Dr. Garcia from neurology also consulted and recommended EEG and also work up sepsis and also added vempat    Pt does not do recreational drugs and compliant with his meds. Had denied sleep deprivation, no falls and no fevers or headaches.    I discussed the plan of care with  Dr. Pickett    Review of Systems  Review of Systems   Unable to perform ROS: Acuity of condition       Past Medical History   has a past medical history of Seizure.    Surgical History   has no past surgical history on file.     Family History  family history is not on file.   Family history reviewed with patient. There is no family history " that is pertinent to the chief complaint.     Social History   non smoker, social etoh    Allergies  No Known Allergies    Medications  Prior to Admission Medications   Prescriptions Last Dose Informant Patient Reported? Taking?   Calcium Carbonate-Vit D-Min (CALCIUM 1200 PO) 5/18/2022 at 0900 Patient Yes No   Sig: Take 1 Tablet by mouth every day.   Levetiracetam (KEPPRA XR) 750 MG TABLET SR 24 HR 5/18/2022 at 0900 Patient Yes No   Sig: Take 1,500 Tablets by mouth 2 times a day.   multivitamin (THERAGRAN) TABS 5/18/2022 at 0900 Patient Yes No   Sig: Take 1 Tab by mouth every day.      Facility-Administered Medications: None       Physical Exam  Temp:  [36.8 °C (98.2 °F)] 36.8 °C (98.2 °F)  Pulse:  [] 90  Resp:  [20-34] 26  BP: (112-202)/(55-97) 112/58  SpO2:  [92 %-100 %] 100 %  Blood Pressure: 115/70   Temperature: 36.8 °C (98.2 °F)   Pulse: 88   Respiration: (!) 22   Pulse Oximetry: 94 %       Physical Exam  Constitutional:       Comments: intubated   HENT:      Head: Normocephalic and atraumatic.      Mouth/Throat:      Mouth: Mucous membranes are dry.   Eyes:      Extraocular Movements: Extraocular movements intact.      Pupils: Pupils are equal, round, and reactive to light.   Cardiovascular:      Rate and Rhythm: Normal rate.   Pulmonary:      Effort: No respiratory distress.      Breath sounds: Normal breath sounds. No wheezing.   Abdominal:      General: Abdomen is flat. Bowel sounds are normal.      Palpations: Abdomen is soft.   Musculoskeletal:         General: Normal range of motion.      Cervical back: Normal range of motion.   Skin:     General: Skin is warm and dry.   Neurological:      Comments: Sedated and intubated   Psychiatric:      Comments: Sedated and intubated and unable to assess         Laboratory:  Recent Labs     05/18/22  1327   WBC 35.7*   RBC 5.34   HEMOGLOBIN 16.3   HEMATOCRIT 48.4   MCV 90.6   MCH 30.5   MCHC 33.7   RDW 38.6   PLATELETCT 452*   MPV 10.5     Recent Labs      05/18/22  1327 05/18/22  1753   SODIUM 140 133*   POTASSIUM 4.1 4.0   CHLORIDE 99 104   CO2 8* 10*   GLUCOSE 238* 111*   BUN 16 12   CREATININE 1.34 0.96   CALCIUM 9.7 6.3*     Recent Labs     05/18/22  1327 05/18/22  1533 05/18/22  1753   ALTSGPT 87*  --   --    ASTSGOT 44  --   --    ALKPHOSPHAT 107*  --   --    TBILIRUBIN 0.2  --   --    LIPASE  --  36  --    GLUCOSE 238*  --  111*         No results for input(s): NTPROBNP in the last 72 hours.  Recent Labs     05/18/22  1753   TRIGLYCERIDE 1002*     No results for input(s): TROPONINT in the last 72 hours.    Imaging:  [id number] VIEW   Final Result      NG tube turns at the GE junction with tip in the mid to distal esophagus.                  DX-CHEST-PORTABLE (1 VIEW)   Final Result         1.  Endotracheal tube is noted in place with tip at the lower end of the clavicles.      2.  NG tube turns at the level of the GE junction with the tip located in the mid to distal esophagus.      3.  No new infiltrates or consolidations.      DX-SHOULDER 2+ RIGHT   Final Result      1.  Closed reduction of previously seen posterior shoulder dislocation.   2.  Comminuted and displaced humeral head fractures.   3.  Small presumed fracture of the superior glenoid.      CT-SHOULDER WITH PLUS RECONS RIGHT   Final Result      1.  Posterior shoulder dislocation with associated comminuted reverse Hill-Sachs lesion. Multiple fracture fragments within the joint space.   2.  Likely tiny fracture fragment of the superior glenoid rim.   3.  Associated glenohumeral joint effusion with tiny focus of gas of uncertain source.      CT-ABDOMEN-PELVIS WITH   Final Result      No acute inflammation in the abdomen or pelvis.      DX-CHEST-PORTABLE (1 VIEW)   Final Result      No acute cardiac or pulmonary abnormalities are identified.      DX-SHOULDER 2+ RIGHT   Final Result      Calcification in the region of the lower glenohumeral joint recess could be a fracture fragment or could be related  to synovial osteochondromatosis. Other inflammatory conditions such as pigmented villonodular synovitis might also cause this appearance.    Further assessment could be performed with CT or MRI as determined clinically.      CT-HEAD W/O   Final Result         NO ACUTE ABNORMALITIES ARE NOTED ON CT SCAN OF THE HEAD.               DX-CHEST-PORTABLE (1 VIEW)    (Results Pending)       X-Ray:  I have personally reviewed the images and compared with prior images.   CXR post intubation does show infiltrate in the right middle lobe  OG tube in esophagus and curled back up - removed  ET tube in good position    Assessment/Plan:  Justification for Admission Status  I anticipate this patient will require at least two midnights for appropriate medical management, necessitating inpatient admission because status apelipticus    * Cardiac arrest (HCC)- (present on admission)  Assessment & Plan  PEA during seizure  Likely due to the severity of his acidosis and hypoxemia with seizure  One round of CPR returned to spontaneous circulation  Moved all 4 extremities immediately and due to status epilepticus and severe acidosis did not place on hypothermia protocol    Intubated now and on propofol    Status epilepticus (HCC)- (present on admission)  Assessment & Plan  Known seizure DO  No break through seizures since 2013  Neurology consult appreciated  Added Vimpat to keppra (loaded with keppra in ER)  EEG     History of mechanical ventilation  Assessment & Plan  Due to not protecting airway  Driving pressures wnl  No secretions  Sedated with propofol    Leukocytosis (leucocytosis)  Assessment & Plan  Without bandemia  Initial cxr clear but post intubation now RML infiltrate may be aspiration  Leucocytosis may be due to seizures  Given zosyn, vanc and clindamycin in ER  No fevers  Proceed with LP - consented parents - add abx if suggests infection  Blood cultures drawn  CRP wnl  Screen for COVID (no exposure)    Lactic acidosis-  (present on admission)  Assessment & Plan  Severe up to 10 now  No obvious signs of infection and may be from status epilepticus  Fluid ressucitation  Trend  CT abdoman and pelvis negative  Drug screen negative  No etoh dependence  Creatinine wnl    Dislocation of right shoulder joint- (present on admission)  Assessment & Plan  Likely injury post seizure but CT shoulder shows synovial fluid and some fractured fragments - will need follow up  Seen by DR. Goel in the ER and shoulder repositioned      VTE prophylaxis: SCDs/TEDs     CC time managing status epilepticus and does not include procedures was 56 mins

## 2022-05-19 NOTE — ASSESSMENT & PLAN NOTE
Without bandemia  Initial cxr clear but post intubation now RML infiltrate may be aspiration  Leucocytosis may be due to seizures  Given zosyn, vanc and clindamycin in ER  No fevers  Proceed with LP - consented parents - add abx if suggests infection  Blood cultures drawn  CRP wnl  Screen for COVID (no exposure)

## 2022-05-19 NOTE — ASSESSMENT & PLAN NOTE
Cardiac arrest + rhabdo    Fluid resuscitation   Strict I/Os  Renally dosed medications  Avoid nephrotoxic agents as able  Trend

## 2022-05-19 NOTE — CARE PLAN
The patient is Stable - Low risk of patient condition declining or worsening         Progress made toward(s) clinical / shift goals:    Problem: Pain - Standard  Goal: Alleviation of pain or a reduction in pain to the patient’s comfort goal  Outcome: Progressing     Problem: Knowledge Deficit - Standard  Goal: Patient and family/care givers will demonstrate understanding of plan of care, disease process/condition, diagnostic tests and medications  Outcome: Progressing     Problem: Respiratory  Goal: Patient will achieve/maintain optimum respiratory ventilation and gas exchange  Outcome: Progressing     Problem: Fall Risk  Goal: Patient will remain free from falls  Outcome: Met     Problem: Safety - Medical Restraint  Goal: Remains free of injury from restraints (Restraint for Interference with Medical Device)  Outcome: Met     Problem: Hemodynamics  Goal: Patient's hemodynamics, fluid balance and neurologic status will be stable or improve  Outcome: Met     Problem: Mechanical Ventilation  Goal: Safe management of artificial airway and ventilation  Outcome: Met  Goal: Successful weaning off mechanical ventilator, spontaneously maintains adequate gas exchange  Outcome: Met  Goal: Patient will be able to express needs and understand communication  Outcome: Met       Patient is not progressing towards the following goals:

## 2022-05-19 NOTE — CARE PLAN
The patient is Watcher - Medium risk of patient condition declining or worsening         Problem: Pain - Standard  Goal: Alleviation of pain or a reduction in pain to the patient’s comfort goal  Outcome: Progressing     Problem: Knowledge Deficit - Standard  Goal: Patient and family/care givers will demonstrate understanding of plan of care, disease process/condition, diagnostic tests and medications  Outcome: Progressing     Problem: Fall Risk  Goal: Patient will remain free from falls  Outcome: Progressing     Problem: Safety - Medical Restraint  Goal: Remains free of injury from restraints (Restraint for Interference with Medical Device)  Outcome: Progressing  Goal: Free from restraint(s) (Restraint for Interference with Medical Device)  Outcome: Progressing     Problem: Hemodynamics  Goal: Patient's hemodynamics, fluid balance and neurologic status will be stable or improve  Outcome: Progressing     Problem: Respiratory  Goal: Patient will achieve/maintain optimum respiratory ventilation and gas exchange  Outcome: Progressing     Problem: Mechanical Ventilation  Goal: Safe management of artificial airway and ventilation  Outcome: Progressing  Goal: Successful weaning off mechanical ventilator, spontaneously maintains adequate gas exchange  Outcome: Progressing  Goal: Patient will be able to express needs and understand communication  Outcome: Progressing     Problem: Risk for Aspiration  Goal: Patient's risk for aspiration will be absent or decrease  Outcome: Progressing

## 2022-05-19 NOTE — PROGRESS NOTES
"Critical Care Progress Note    Date of admission  5/18/2022    Chief Complaint  \"Florentino Ny is a 32 y.o. male who presented 5/18/2022 with known hx of seizures on Keppra 1500 mg bid with no breakthrough seizures since 2013.  Pt woke up with right shoulder pain and was his only complaint before his partner came home today and found him slumped in the bathroom. EMS called and brought to the ER and had another tonic clonic seizure 1 min.   In the Er while he was being assessed for his shoulder he had a tonic clonic seizure and given 2 mg of ativan and became cyanotic and had PEA cardiac arrest requiring one round of epinephrine and 2 mins CPR with ROSC. Intubated,     Initial labs were HCO3 of 8 and wbc of 35 and lactic of 7.5     Due to elevated lactic acid CT abdoman pelvis done negative for abnormalities  CT head negative for bleed or abnormalities  CT of right shoulder showed dislocation with comminuted revere Hill Sachs lesion with multiple fracture fragments in the joint space. Also \"Associated glenohumeral joint effusion with tiny focus of gas of uncertain source.\"     Dr. Goel from Orthopedics reduced the right shoulder     Dr. Garcia from neurology also consulted and recommended EEG and also work up sepsis and also added vempat     Pt does not do recreational drugs and compliant with his meds. Had denied sleep deprivation, no falls and no fevers or headaches.\"    Hospital Course  5/18 cardiac arrest; intubated  5/19 SAT/SBT --> trial extubation    Interval Problem Update  Reviewed last 24 hour events:  Awake, alert  VD2  Prop + fent  CSF studies reassuring  VTE ppx  H2 blocker  EEG negative but has irritability   Vimpat + Keppra     Update: successfully extubated    Review of Systems  Review of Systems   Unable to perform ROS: Intubated        Vital Signs for last 24 hours   Temp:  [36.8 °C (98.2 °F)-37.1 °C (98.7 °F)] 37.1 °C (98.7 °F)  Pulse:  [] 103  Resp:  [7-34] 32  BP: " (107-202)/(55-97) 121/71  SpO2:  [92 %-100 %] 98 %    Hemodynamic parameters for last 24 hours       Respiratory Information for the last 24 hours  Vent Mode: APVCMV  Rate (breaths/min): 26  Vt Target (mL): 430  PEEP/CPAP: 8  MAP: 12  Control VTE (exp VT): 462    Physical Exam   Physical Exam  Vitals and nursing note reviewed.   Constitutional:       Appearance: He is ill-appearing.   HENT:      Head: Normocephalic and atraumatic.      Right Ear: External ear normal.      Left Ear: External ear normal.      Nose: Nose normal.      Mouth/Throat:      Mouth: Mucous membranes are moist.   Eyes:      Pupils: Pupils are equal, round, and reactive to light.   Cardiovascular:      Rate and Rhythm: Normal rate and regular rhythm.      Pulses: Normal pulses.   Pulmonary:      Comments: Equal and symmetric breath sounds with mechanical ventilation   Abdominal:      General: Abdomen is flat. There is no distension.      Palpations: Abdomen is soft.      Tenderness: There is no abdominal tenderness. There is no guarding or rebound.   Musculoskeletal:      Right lower leg: No edema.      Left lower leg: No edema.   Skin:     General: Skin is warm and dry.      Capillary Refill: Capillary refill takes less than 2 seconds.   Neurological:      Mental Status: He is alert.      Comments: Awake, alert, follows, moves all 4 extremities          Medications  Current Facility-Administered Medications   Medication Dose Route Frequency Provider Last Rate Last Admin   • acetaminophen (Tylenol) tablet 650 mg  650 mg Enteral Tube Q4HRS PRN Robert Avelar M.D.       • senna-docusate (PERICOLACE or SENOKOT S) 8.6-50 MG per tablet 2 Tablet  2 Tablet Enteral Tube BID Esha Reaves M.D.        And   • polyethylene glycol/lytes (MIRALAX) PACKET 1 Packet  1 Packet Enteral Tube QDAY PRN Esha Reaves M.D.        And   • magnesium hydroxide (MILK OF MAGNESIA) suspension 30 mL  30 mL Enteral Tube QDAY PRN Esha Reaves M.D.         And   • bisacodyl (DULCOLAX) suppository 10 mg  10 mg Rectal QDAY PRN Esha Reaves M.D.       • NS infusion   Intravenous Continuous Esha Reaves M.D. 150 mL/hr at 05/19/22 0544 New Bag at 05/19/22 0544   • enoxaparin (Lovenox) inj 40 mg  40 mg Subcutaneous DAILY AT 1800 Esha Reaves M.D.   40 mg at 05/18/22 1824   • insulin regular (HumuLIN R,NovoLIN R) injection  1-6 Units Subcutaneous Q6HRS Esha Reaves M.D.   1 Units at 05/18/22 1822    And   • dextrose 50% (D50W) injection 25 g  25 g Intravenous Q15 MIN PRN Esha Reaves M.D.       • Respiratory Therapy Consult   Nebulization Continuous RT Esha Reaves M.D.       • MD Alert...ICU Electrolyte Replacement per Pharmacy   Other PHARMACY TO DOSE Esha Reaves M.D.       • levETIRAcetam (Keppra) injection 1,500 mg  1,500 mg Intravenous Q12HRS Esha Reaves M.D.   1,500 mg at 05/19/22 0600   • lacosamide (Vimpat) injection 200 mg  200 mg Intravenous BID Gale Garcia M.D.   200 mg at 05/19/22 0514       Fluids    Intake/Output Summary (Last 24 hours) at 5/19/2022 1115  Last data filed at 5/19/2022 0800  Gross per 24 hour   Intake 7024.04 ml   Output 2225 ml   Net 4799.04 ml       Laboratory  Recent Labs     05/18/22  1756 05/19/22  0403   ISTATAPH 7.245* 7.417   ISTATAPCO2 39.1* 30.9   ISTATAPO2 288* 130*   ISTATATCO2 18* 21   [id number] 100* 99   ISTATARTHCO3 17.0 19.9   ISTATARTBE -10* -4   ISTATTEMP 97.6 F 100.6 F   ISTATFIO2 100 40   ISTATSPEC Arterial Arterial   ISTATAPHTC 7.253* 7.401   [id number] 285* 137*     Recent Labs     05/18/22  1533 05/19/22  0513   CPKTOTAL 233* 5174*     Recent Labs     05/18/22  1753 05/18/22  2102 05/19/22  0513   SODIUM 133* 140 139   POTASSIUM 4.0 4.2 3.6   CHLORIDE 104 108 109   CO2 10* 15* 19*   BUN 12 16 17   CREATININE 0.96 1.65* 1.94*   MAGNESIUM  --   --  2.6*   PHOSPHORUS  --   --  3.8   CALCIUM 6.3* 7.6* 7.7*     Recent Labs     05/18/22  1327  05/18/22  1533 05/18/22  1753 05/18/22  2102 05/19/22  0513   ALTSGPT 87*  --   --   --   --    ASTSGOT 44  --   --   --   --    ALKPHOSPHAT 107*  --   --   --   --    TBILIRUBIN 0.2  --   --   --   --    LIPASE  --  36  --   --   --    GLUCOSE 238*  --  111* 100* 103*     Recent Labs     05/18/22  1327 05/19/22  0513   WBC 35.7* 11.8*   NEUTSPOLYS 78.20* 81.50*   LYMPHOCYTES 20.00* 8.50*   MONOCYTES 0.90 9.20   EOSINOPHILS 0.90 0.10   BASOPHILS 0.00 0.30   ASTSGOT 44  --    ALTSGPT 87*  --    ALKPHOSPHAT 107*  --    TBILIRUBIN 0.2  --      Recent Labs     05/18/22 1327 05/19/22  0513   RBC 5.34 3.93*   HEMOGLOBIN 16.3 11.9*   HEMATOCRIT 48.4 34.5*   PLATELETCT 452* 213       Imaging  X-Ray:  I have personally reviewed the images and compared with prior images.    Assessment/Plan  * Acute respiratory failure with hypoxia (HCC)  Assessment & Plan  Intubated during cardiac arrest    Lung protective ventilation strategies  Optimize oxygenation, ventilation, and acid base balance  ABCDEF Bundle     MINNA (acute kidney injury) ([facility])  Assessment & Plan  Cardiac arrest + rhabdo    Fluid resuscitation   Strict I/Os  Renally dosed medications  Avoid nephrotoxic agents as able  Trend     Rhabdomyolysis  Assessment & Plan  From seizures    Fluid resuscitation  Trend    Lactic acidosis- (present on admission)  Assessment & Plan  Seizure and cardiac arrest induced  Improving    Dislocation of right shoulder joint- (present on admission)  Assessment & Plan  Likely injury post seizure but CT shoulder shows synovial fluid and some fractured fragments - will need follow up  Goel reduced in ER    Status epilepticus (HCC)- (present on admission)  Assessment & Plan  Known seizure disorder  CTH reassuring  CSF studies reassuring   No break through seizures since 2013    Neurology following  Vimpat + keppra  EEG w/o seizures but does have signs of irritability     Cardiac arrest (HCC)- (present on admission)  Assessment & Plan  PEA  during seizure  Hypoxia + acidosis   One round of CPR returned to spontaneous circulation  Appears neuro intact    Maintain normothermia, euglyemia, normal PaO2/PCO2  Formal ECHO       VTE:  Lovenox  Ulcer: H2 Antagonist  Lines: Lawson Catheter  Ongoing indication addressed    I have performed a physical exam and reviewed and updated ROS and Plan today (5/19/2022). In review of yesterday's note (5/18/2022), there are no changes except as documented above.     Discussed patient condition and risk of morbidity and/or mortality with Family, RN, RT, Therapies, Pharmacy, Charge nurse / hot rounds and Patient  The patient remains critically ill.  Critical care time = 63 minutes in directly providing and coordinating critical care and extensive data review.  No time overlap and excludes procedures.

## 2022-05-19 NOTE — PROGRESS NOTES
Submitted custom order for Gunslinger brace to right shoulder/arm (abduction and external rotation). To check the status of the order please call [phone].

## 2022-05-19 NOTE — CONSULTS
Referring Physician: Dr. Deyanira Pickett    Referral Reason: Intractable seizure    HPI:    Please note the patient is intubated and sedated and this history was obtained by discussing the case with Dr. Pickett.  Mr. Florentino Ny is a 32 y.o. male with history of seizure disorder who apparently has been seizure-free for couple of years, was brought to emergency room for evaluation of intractable seizure.  He is currently on Keppra 1500 mg twice a day and reported compliance with his seizure medication.  He is followed by Dr. Marquez, neurologist as an outpatient.  Apparently patient reported to have a seizure this morning and subsequently when his partner came home this afternoon noted he is slumped over the toilet and is altered.  Paramedics were called and patient had 1 minute generalized tonic-clonic seizure witnessed by paramedics.  Patient also reported some right shoulder pain when he awake this morning and felt he might have had seizure overnight as well.  On initial evaluation in the emergency room he was noted to have leukocytosis with elevated white count to 35.7.  Patient was a started on broad-spectrum antibiotic with Zosyn, vancomycin and clindamycin.  His brain CT was unremarkable.  While in the emergency room he began to have another seizure and subsequently turned gray and blue and no pulse was palpable and he became apneic.  CPR initiated and subsequently patient was intubated in the emergency room.  He was started on propofol and given additional dose of Keppra.    ROS:   Unable to obtain.    Past Medical History:   Past Medical History:   Diagnosis Date   • Seizure        Past Surgical History: No past surgical history on file.    Social History:   Social History     Socioeconomic History   • Marital status: Single     Spouse name: Not on file   • Number of children: Not on file   • Years of education: Not on file   • Highest education level: Not on file   Occupational History   • Not on  file   Tobacco Use   • Smoking status: Not on file   • Smokeless tobacco: Not on file   Substance and Sexual Activity   • Alcohol use: Not on file   • Drug use: Not on file   • Sexual activity: Not on file   Other Topics Concern   • Not on file   Social History Narrative   • Not on file     Social Determinants of Health     Financial Resource Strain: Not on file   Food Insecurity: Not on file   Transportation Needs: Not on file   Physical Activity: Not on file   Stress: Not on file   Social Connections: Not on file   Intimate Partner Violence: Not on file   Housing Stability: Not on file       Family Hx: No family history on file.    Current Medications:   Current Facility-Administered Medications   Medication Dose Route Frequency Provider Last Rate Last Admin   • vancomycin (VANCOCIN) 2,500 mg in  mL IVPB  25 mg/kg Intravenous Once Deyanira Pickett M.D. 166.7 mL/hr at 05/18/22 1614 2,500 mg at 05/18/22 1614   • senna-docusate (PERICOLACE or SENOKOT S) 8.6-50 MG per tablet 2 Tablet  2 Tablet Enteral Tube BID Esha Reaves M.D.        And   • polyethylene glycol/lytes (MIRALAX) PACKET 1 Packet  1 Packet Enteral Tube QDAY PRN Esha Reaves M.D.        And   • magnesium hydroxide (MILK OF MAGNESIA) suspension 30 mL  30 mL Enteral Tube QDAY PRHORACIO Reaves M.D.        And   • bisacodyl (DULCOLAX) suppository 10 mg  10 mg Rectal QDAY PRN Esha Reaves M.D.       • NS infusion   Intravenous Continuous Esha Reaves M.D. 150 mL/hr at 05/18/22 1837 New Bag at 05/18/22 1837   • enoxaparin (Lovenox) inj 40 mg  40 mg Subcutaneous DAILY AT 1800 Esha Reaves M.D.   40 mg at 05/18/22 1824   • insulin regular (HumuLIN R,NovoLIN R) injection  1-6 Units Subcutaneous Q6HRS Esha Reaves M.D.   1 Units at 05/18/22 1822    And   • dextrose 50% (D50W) injection 25 g  25 g Intravenous Q15 MIN PRN Balbuena Madhani-Brenda, M.D.       • Respiratory Therapy Consult   Nebulization  Continuous RT Esha Reaves M.D.       • famotidine (PEPCID) tablet 20 mg  20 mg Enteral Tube Q12HRS Esha Reaves M.D.        Or   • famotidine (PEPCID) injection 20 mg  20 mg Intravenous Q12HRS Esha Reaves M.D.   20 mg at 05/18/22 1824   • MD Alert...ICU Electrolyte Replacement per Pharmacy   Other PHARMACY TO DOSE Esha Reaves M.D.       • lidocaine (XYLOCAINE) 1 % injection 2 mL  2 mL Tracheal Tube Q30 MIN PRN Esha Reaves M.D.       • propofol (DIPRIVAN) injection  0-80 mcg/kg/min Intravenous Continuous Esha Reaves M.D. 35.9 mL/hr at 05/18/22 1753 60 mcg/kg/min at 05/18/22 1753   • [START ON 5/19/2022] levETIRAcetam (Keppra) injection 1,500 mg  1,500 mg Intravenous Q12HRS Esha Reaves M.D.           Allergies: No Known Allergies    Physical Exam:   Vitals:    05/18/22 1730 05/18/22 1750 05/18/22 1800 05/18/22 1805   BP: 117/56 128/67 122/63    Pulse: (!) 116 (!) 114 (!) 112 (!) 109   Resp: (!) 22 (!) 28 (!) 26 (!) 28   Temp:       TempSrc:       SpO2: 100%  100% 100%   Weight:       Height:           Physical Exam   GENERAL: He is intubated and sedated.  Head: Normocephalic and atraumatic.   Eyes: Pupils are equal, round, and reactive to light.   Cardiovascular: Normal rate and regular rhythm.    Pulmonary/Chest: Breath sounds normal.   Abdominal: Soft. Bowel sounds are normal. He exhibits no distension. There is no tenderness.   Skin: Skin is warm and dry. No rash noted. No erythema.  Neuro Exam  MENTAL STATUS: The patient is intubated, sedated on propofol.  I do not appreciate facial asymmetry.  Facial sensation cannot be tested.  His pupils are equal and reactive.  He withdraws to physical stimulation in all 4 extremity.  Motor, sensory and coordination cannot be tested.  Deep tendon reflexes are diffusely diminished.  Plantar reflexes are equivocal.  GAIT:  Deferred     Labs:  Recent Labs     05/18/22  1327   WBC 35.7*   RBC 5.34   HEMOGLOBIN  16.3   HEMATOCRIT 48.4   MCV 90.6   MCH 30.5   MCHC 33.7   RDW 38.6   PLATELETCT 452*   MPV 10.5     Recent Labs     05/18/22  1327   SODIUM 140   POTASSIUM 4.1   CHLORIDE 99   CO2 8*   GLUCOSE 238*   BUN 16   CREATININE 1.34   CALCIUM 9.7             Recent Labs     05/18/22  1533   CPKTOTAL 233*         Recent Labs     05/18/22  1327 05/18/22  1533   SODIUM 140  --    POTASSIUM 4.1  --    CHLORIDE 99  --    CO2 8*  --    GLUCOSE 238*  --    BUN 16  --    CPKTOTAL  --  233*     Recent Labs     05/18/22  1327   SODIUM 140   POTASSIUM 4.1   CHLORIDE 99   CO2 8*   BUN 16   CREATININE 1.34   CALCIUM 9.7         No results found for this or any previous visit.      Imaging reviewed:    CT-SHOULDER WITH PLUS RECONS RIGHT   Final Result      1.  Posterior shoulder dislocation with associated comminuted reverse Hill-Sachs lesion. Multiple fracture fragments within the joint space.   2.  Likely tiny fracture fragment of the superior glenoid rim.   3.  Associated glenohumeral joint effusion with tiny focus of gas of uncertain source.      CT-ABDOMEN-PELVIS WITH   Final Result      No acute inflammation in the abdomen or pelvis.      DX-CHEST-PORTABLE (1 VIEW)   Final Result      No acute cardiac or pulmonary abnormalities are identified.      DX-SHOULDER 2+ RIGHT   Final Result      Calcification in the region of the lower glenohumeral joint recess could be a fracture fragment or could be related to synovial osteochondromatosis. Other inflammatory conditions such as pigmented villonodular synovitis might also cause this appearance.    Further assessment could be performed with CT or MRI as determined clinically.      CT-HEAD W/O   Final Result         NO ACUTE ABNORMALITIES ARE NOTED ON CT SCAN OF THE HEAD.               DX-CHEST-PORTABLE (1 VIEW)    (Results Pending)   DX-SHOULDER 2+ RIGHT    (Results Pending)   [id number] VIEW    (Results Pending)          Assessment/Plan:  32 y.o. male with history of seizure disorder  currently on Keppra 1500 mg twice a day who was brought to emergency room for evaluation of intractable seizure.  Apparently patient has not had seizure for couple of years.  On initial evaluation he was noted to have leukocytosis and undergoing work-up for sepsis.  Infection may have lowered his seizure threshold causing intractable seizure.  He will continue with Keppra 1500 mg twice a day.  I will add Vimpat 200 mg IV twice a day during this acute phase.  I have suggested stat EEG as patient is unresponsive and cannot assess seizure activity without EEG.  At present time there is no seizure-like activity while he is intubated and sedated.  Continue with seizure precaution and treat breakthrough seizure with Ativan.  Pending results of EEG to adjust his medication as needed.  Patient needs to be reported to DMV for driving restriction.  Discussed with Dr. Pickett.  Total critical care time spent was 40 minutes.

## 2022-05-19 NOTE — PROGRESS NOTES
4 Eyes Skin Assessment Completed by Roberta RN and GREGORIA Petersen.    Head WDL  Ears WDL  Nose WDL  Mouth WDL  Neck WDL  Breast/Chest WDL  Shoulder Blades WDL  Spine WDL  (R) Arm/Elbow/Hand WDL  (L) Arm/Elbow/Hand WDL  Abdomen WDL  Groin WDL  Scrotum/Coccyx/Buttocks WDL  (R) Leg WDL  (L) Leg WDL  (R) Heel/Foot/Toe WDL  (L) Heel/Foot/Toe WDL          Devices In Places ECG, Blood Pressure Cuff, Pulse Ox and Lawson      Interventions In Place Pillows and Pressure Redistribution Mattress    Possible Skin Injury No    Pictures Uploaded Into Epic N/A  Wound Consult Placed N/A  RN Wound Prevention Protocol Ordered No

## 2022-05-19 NOTE — ASSESSMENT & PLAN NOTE
Intubated during cardiac arrest    Lung protective ventilation strategies  Optimize oxygenation, ventilation, and acid base balance  ABCDEF Bundle

## 2022-05-19 NOTE — ED NOTES
1628 Cassy BLANKENSHIP at bedside speaking with patient and partner, patient started to have his 3rd seizure today.   1630 2mg of Ativan given, Respiratory Arrest, Bagging patient  1633 Cardiac arrest, CPR started.   1634 One of epi given.   1634 Patient in SVT, Continued to bag patient.   1637 3rd IV placed, 18g R AC  1628 Bolus NS  1638 Fingerstick glucose 140.   1640 Patient in Sinus Tach, 200 Succinylcholine given   1641 Intubation with 8.0 at 24cm  1644 Bolused with 40 of propofol.      By 1700, patient had hdz catheter, OG tube, and restraints placed. Partner outside of the room with social work at the time of arrest. Family was updated by MD.

## 2022-05-19 NOTE — PROCEDURES
INPATIENT ROUTINE VIDEO ELECTROENCEPHALOGRAM REPORT      Referring provider:   Dr. Pickett    DOS: 05/18/22 (0 hours and 22 minutes of total recording time).     INDICATION:  Florentino Ny 32 y.o. male presenting with seizure(s)    CURRENT ANTIEPILEPTIC AND/OR SEDATING REGIMEN:   vimpat  keppra  Propofol gtt    TECHNIQUE: Routine VEEG was set up by a Neurodiagnostic technologist who performed education to the patient and staff. A minimum of 23 electrodes and 23 channel recording was setup and performed by Neurodiagnostic technologist, in accordance with the international 10-20 system. The study was reviewed in bipolar and referential montages. The recording examined the patient in the  drowsy/sleep and/or comatose state(s).     DESCRIPTION OF THE RECORD:  The background was symmetrical, occasionally discontinuous, and composed mostly of low to medium amplitude polymorphic generalized delta slowing with faster frequencies.  A waking posterior dominant rhythm was absent.  State changes were absent.  Spontaneous variability of the background was present.  Reactivity was minimal but not altogether absent.  Normal N2 sleep architecture was not seen.        ACTIVATION PROCEDURES:   NA    ICTAL AND INTERICTAL FINDINGS:   Overriding the generalized delta slowing were frequent runs of generalized frontally predominant spiky fast alpha/beta activity.  There is no clinical correlate to this activity.    No regional slowing was seen during this routine study.      No definite electrographic or electroclinical seizures.     EKG: Sampling of the EKG recording did not demonstrate any abnormalities      EVENTS:  None     INTERPRETATION:   Abnormal video EEG recording in the drowsy/sleep and/or comatose state(s):  -Moderate to severe background slowing suggestive of diffuse/multifocal cerebral dysfunction. Sedation is likely contributing to this finding.   - No persistent focal asymmetries seen.  -Against the diffuse  background slowing there were frequent runs of generalized frontally predominant spiky fast alpha/beta activity, often suspiciously epileptiform.  This activity did not have any clinical correlate.  These were not seizures but likely reflect ongoing diffuse cortical irritability with an ongoing increased risk for seizures.    - No definitive seizures. Clinical correlation is recommended.       Note: If the clinical suspicion remains high for seizures, a prolonged recording to capture clinical or subclinical events may be helpful.        Collin Almonte MD  Epilepsy and General Neurology  Department of Neurology  Instructor of Clinical Neurology [facility].   Office: [phone]  Fax: [phone]

## 2022-05-19 NOTE — PROCEDURES
Procedure Lumbar Puncture    Date/Time: 5/18/2022 10:30 PM  Performed by: Robina Garcia M.D.  Authorized by: Robina Garcia M.D.     Consent:     Consent obtained:  Verbal    Consent given by:  Healthcare agent    Risks discussed:  Bleeding, infection and nerve damage    Alternatives discussed:  No treatment  Pre-procedure details:     Procedure purpose:  Therapeutic    Preparation: Patient was prepped and draped in usual sterile fashion    Sedation:     Sedation type:  Moderate (conscious) sedation  Anesthesia:     Anesthesia method:  Local infiltration    Local anesthetic:  Lidocaine 1% w/o epi  Procedure details:     Lumbar space:  L4-L5 interspace    Patient position:  L lateral decubitus    Needle gauge:  18    Needle type:  Spinal needle - Quincke tip    Needle length (in):  5.0    Ultrasound guidance: no      Number of attempts:  2    Fluid appearance:  Blood-tinged then clearing    Tubes of fluid:  4    Total volume (ml):  5  Post-procedure:     Puncture site:  Adhesive bandage applied and direct pressure applied    Patient tolerance of procedure:  Tolerated well, no immediate complications

## 2022-05-19 NOTE — ASSESSMENT & PLAN NOTE
PEA during seizure  Hypoxia + acidosis   One round of CPR returned to spontaneous circulation  Appears neuro intact    Maintain normothermia, euglyemia, normal PaO2/PCO2  Formal ECHO

## 2022-05-19 NOTE — ASSESSMENT & PLAN NOTE
Known seizure disorder  CTH reassuring  CSF studies reassuring   No break through seizures since 2013    Neurology following  Vimpat + keppra  EEG w/o seizures but does have signs of irritability

## 2022-05-19 NOTE — OP REPORT
DATE OF OPERATION: 5/18/2022     PREOPERATIVE DIAGNOSIS: Right posterior shoulder dislocation    POSTOPERATIVE DIAGNOSIS: Same    PROCEDURE PERFORMED: Closed reduction under anesthesia right posterior shoulder dislocation    SURGEON: Ferdinand Goel M.D.     ASSISTANT: None    ANESTHESIA: General      INDICATIONS: The patient is a 32 y.o. male who presented with right posterior shoulder dislocation that occurred after a seizure.  He is being worked up currently for sepsis.  Recommended close reduction of the shoulder.  Given the large Hill-Sachs lesion though he may still have repeat instability especially of other seizure activity were to occur.  He may still need further operative intervention as well.    DESCRIPTION OF PROCEDURE: Patient was seen in the emergency department where he is already under paralysis and general anesthesia.  Shoulder was evaluated and this initially showed gross alignment consistent with a posterior dislocation.  Range of motion was limited.  A closed reduction was done by providing lateral traction and internal rotation until the humeral head could be manipulated over the glenoid.  This had a palpable reduction.  Following this the shoulder be taken through full range of motion.  With full internal rotation and posterior force he still had instability.  With external rotation of the arm remained stable.  Pillows were placed around the arm to mobilize and external rotation and abduction.    POSTOPERATIVE PLAN: Maintain external rotation and abduction as able.  Await further bracing.  If further instability occurs we will hold off on reduction until an appropriate brace is available.      ____________________________________   Ferdinand Goel M.D.   DD: 5/19/2022  8:46 AM

## 2022-05-19 NOTE — CONSULTS
"Date of Service: 05/18/22    Florentino Ny was seen today in consultation for right posterior shoulder dislocation at the request of Dr. Pickett    CC: Right shoulder dislocation    HPI: Florentino Ny is a 32 y.o. male who presented a history of seizure.  He was found by his significant other slumped over on the toilet.  He was brought into the hospital and there is concern for both seizure activity as well as sepsis.  He had an arrest event in the ER.  X-rays also confirmed a posterior shoulder dislocation.  I was consulted for further management of this.  Currently patient is intubated and sedated and unable to give any history.    PMH:   Past Medical History:   Diagnosis Date   • Seizure        PSH: No past surgical history on file.    FH: No family history on file.    SH:   Social History     Socioeconomic History   • Marital status: Single     Spouse name: Not on file   • Number of children: Not on file   • Years of education: Not on file   • Highest education level: Not on file   Occupational History   • Not on file   Tobacco Use   • Smoking status: Not on file   • Smokeless tobacco: Not on file   Substance and Sexual Activity   • Alcohol use: Not on file   • Drug use: Not on file   • Sexual activity: Not on file   Other Topics Concern   • Not on file   Social History Narrative   • Not on file     Social Determinants of Health     Financial Resource Strain: Not on file   Food Insecurity: Not on file   Transportation Needs: Not on file   Physical Activity: Not on file   Stress: Not on file   Social Connections: Not on file   Intimate Partner Violence: Not on file   Housing Stability: Not on file       ROS: Unable to be assessed due to patient's intubation    /71   Pulse (!) 103   Temp 37.1 °C (98.7 °F) (Temporal)   Resp (!) 32   Ht 1.88 m (6' 2\")   Wt 99.8 kg (220 lb)   SpO2 98%     Physical Exam:  General: Well nourished, well developed, age appropriate appearance   HEENT: " Normocephalic, atraumatic  Psych: Unable to assess  Neck: No gross deformity but unable to assess due to intubation  Chest/Pulmonary: Intubated  Cardio: Tachycardic  Neuro: Unable to assess due to intubation and paralytics  Skin: Intact with no full thickness abrasions or lacerations  MSK: Initial deformity to the right shoulder with feeling of posterior subluxation of the humeral head.  No gross deformity or crepitus to the elbow forearm or wrist.  The left upper extremity and bilateral lower extremities again are normal in their gross appearance and do not show crepitus    Imaging and labs: X-ray of the right shoulder showed a posterior shoulder dislocation    Recent Labs     05/18/22  1327 05/19/22  0513   WBC 35.7* 11.8*   RBC 5.34 3.93*   HEMOGLOBIN 16.3 11.9*   HEMATOCRIT 48.4 34.5*   MCV 90.6 87.8   MCH 30.5 30.3   RDW 38.6 37.9   PLATELETCT 452* 213   MPV 10.5 10.1   NEUTSPOLYS 78.20* 81.50*   LYMPHOCYTES 20.00* 8.50*   MONOCYTES 0.90 9.20   EOSINOPHILS 0.90 0.10   BASOPHILS 0.00 0.30   RBCMORPHOLO Present  --        Assessment:   1. Seizure-like activity (HCC)     2. Dehydration     3. Lactic acidosis     4. Leukocytosis, unspecified type     5. Posterior dislocation of right shoulder joint, initial encounter     6. Status epilepticus (HCC)  Lumbar Puncture    Procedure Lumbar Puncture       Recommended urgent reduction of the shoulder at bedside as he is already sedated and paralyzed.  Additional treatment may be necessary if he has repeat instability.        Plan:  Right shoulder reduction under anesthesia.   Maintain abduction and external rotation as able  2 to 3 weeks of immobilization and then begin range of motion activities and strengthening  If repeat instability, we will have him follow-up with sports to discuss operative repairs

## 2022-05-19 NOTE — PROGRESS NOTES
NEUROLOGY PROGRESS NOTE      BACKGROUND:    32 y.o. male was admitted on 5/18/2022  1:09 PM for Cardiac arrest (HCC) [I46.9].  He is being followed by Neurology for intractable seizure.    SUBJECTIVE:   Patient was extubated and is currently awake and fully oriented.  Partner and brother are at bedside.  His partner tells me he has not had any seizure for almost 10 years on current medication.    VITALS:  Vitals:    05/19/22 1000 05/19/22 1100 05/19/22 1200 05/19/22 1300   BP: 123/75 128/78 128/77 131/78   Pulse: 97 (!) 101 (!) 102 93   Resp: (!) 23 (!) 29 (!) 29 18   Temp:   36.8 °C (98.2 °F)    TempSrc:   Temporal    SpO2: 97% 98% 99% 99%   Weight:       Height:           NEUROLOGICAL EXAM:   He is awake, alert and fully oriented.  Speech and memory within normal limits.  Facial motor is intact.  Facial sensation is intact to V1, V2 and V3 distribution.  Pupils are equal and reactive.  Extraocular movement: He has some disconjugate gaze and has partial medial rectus palsy on the left.  Motor examination revealed normal strength to direct testing of both upper and lower extremity.  Sensation is intact to light touch and temperature.  Coordination is intact to finger-to-nose testing.  Deep tendon reflexes are 1+ and equal.  Gait was deferred.    OBJECTIVE:    NEUROIMAGING:    EC-ECHOCARDIOGRAM COMPLETE W/O CONT   Final Result      DX-CHEST-PORTABLE (1 VIEW)   Final Result         1.  Left basilar atelectasis or early infiltrates.   2.  Trace left pleural effusion      DX-ABDOMEN FOR TUBE PLACEMENT   Final Result         1.  Nonspecific bowel gas pattern.   2.  Nasogastric tube tip terminates overlying the expected location of the gastric antrum.   3.  Left basilar atelectasis or early infiltrate.      ME-DWUFSNM-2 VIEW   Final Result      NG tube turns at the GE junction with tip in the mid to distal esophagus.                  DX-CHEST-PORTABLE (1 VIEW)   Final Result         1.  Endotracheal tube is noted in place  with tip at the lower end of the clavicles.      2.  NG tube turns at the level of the GE junction with the tip located in the mid to distal esophagus.      3.  No new infiltrates or consolidations.      DX-SHOULDER 2+ RIGHT   Final Result      1.  Closed reduction of previously seen posterior shoulder dislocation.   2.  Comminuted and displaced humeral head fractures.   3.  Small presumed fracture of the superior glenoid.      CT-SHOULDER WITH PLUS RECONS RIGHT   Final Result      1.  Posterior shoulder dislocation with associated comminuted reverse Hill-Sachs lesion. Multiple fracture fragments within the joint space.   2.  Likely tiny fracture fragment of the superior glenoid rim.   3.  Associated glenohumeral joint effusion with tiny focus of gas of uncertain source.      CT-ABDOMEN-PELVIS WITH   Final Result      No acute inflammation in the abdomen or pelvis.      DX-CHEST-PORTABLE (1 VIEW)   Final Result      No acute cardiac or pulmonary abnormalities are identified.      DX-SHOULDER 2+ RIGHT   Final Result      Calcification in the region of the lower glenohumeral joint recess could be a fracture fragment or could be related to synovial osteochondromatosis. Other inflammatory conditions such as pigmented villonodular synovitis might also cause this appearance.    Further assessment could be performed with CT or MRI as determined clinically.      CT-HEAD W/O   Final Result         NO ACUTE ABNORMALITIES ARE NOTED ON CT SCAN OF THE HEAD.                   MEDICATIONS:  Current Facility-Administered Medications   Medication Dose Route Frequency Provider Last Rate Last Admin   • acetaminophen (Tylenol) tablet 650 mg  650 mg Enteral Tube Q4HRS PRN Robert Avelar M.D.   650 mg at 05/19/22 1330   • senna-docusate (PERICOLACE or SENOKOT S) 8.6-50 MG per tablet 2 Tablet  2 Tablet Enteral Tube BID Esha Reaves M.D.        And   • polyethylene glycol/lytes (MIRALAX) PACKET 1 Packet  1 Packet Enteral Tube  QDAY PRN Esha Reaves M.D.        And   • magnesium hydroxide (MILK OF MAGNESIA) suspension 30 mL  30 mL Enteral Tube QDAY PRN Esha Reaves M.D.        And   • bisacodyl (DULCOLAX) suppository 10 mg  10 mg Rectal QDAY PRN Esha Reaves M.D.       • NS infusion   Intravenous Continuous Esha Reaves M.D. 150 mL/hr at 05/19/22 0544 New Bag at 05/19/22 0544   • enoxaparin (Lovenox) inj 40 mg  40 mg Subcutaneous DAILY AT 1800 Esha Reaves M.D.   40 mg at 05/18/22 1824   • Respiratory Therapy Consult   Nebulization Continuous RT Esha Reaves M.D.       • MD Alert...ICU Electrolyte Replacement per Pharmacy   Other PHARMACY TO DOSE Esha Reaves M.D.       • levETIRAcetam (Keppra) injection 1,500 mg  1,500 mg Intravenous Q12HRS Esha Reaves M.D.   1,500 mg at 05/19/22 0600   • lacosamide (Vimpat) injection 200 mg  200 mg Intravenous BID Gale Garcia M.D.   200 mg at 05/19/22 0514       LABS:  Recent Labs     05/18/22  1533 05/19/22  0513   CPKTOTAL 233* 5174*     Recent Labs     05/18/22  1327 05/19/22  0513   WBC 35.7* 11.8*   RBC 5.34 3.93*   HEMOGLOBIN 16.3 11.9*   HEMATOCRIT 48.4 34.5*   MCV 90.6 87.8   MCH 30.5 30.3   MCHC 33.7 34.5   RDW 38.6 37.9   PLATELETCT 452* 213   MPV 10.5 10.1     Recent Labs     05/18/22  1533 05/18/22  1753 05/18/22  2102 05/19/22  0513   SODIUM  --  133* 140 139   POTASSIUM  --  4.0 4.2 3.6   CHLORIDE  --  104 108 109   CO2  --  10* 15* 19*   GLUCOSE  --  111* 100* 103*   BUN  --  12 16 17   CPKTOTAL 233*  --   --  5174*     No results found for: INR  No results found for: POCINR  Lab Results   Component Value Date/Time    CREATININE 1.94 (H) 05/19/2022 0513     No results found for: IFAFRICA, IFNOTAFR      ASSESSMENT AND PLAN:  32 y.o. male with history of seizure disorder currently on Keppra 1500 mg twice a day who was brought to emergency room for evaluation of intractable seizure.  Apparently patient has not had  seizure for almost 10 years on current dose of Keppra.   Initially in the emergency room he had brief cardiac arrest requiring CPR and intubation.  He has not had any seizures since admission.  His EEG was abnormal due to diffuse slowing of background activity suggestive of encephalopathy, however there was no definitive seizure.  I have added Vimpat 200 mg twice a day given intractable seizure on Keppra 1500 mg twice a day.  Continue with seizure precaution.  Continue supportive care.  Treat breakthrough seizure with Ativan.  Discussed with the patient, his partner and brother at the bedside.

## 2022-05-19 NOTE — ASSESSMENT & PLAN NOTE
Likely injury post seizure but CT shoulder shows synovial fluid and some fractured fragments - will need follow up  Goel reduced in ER

## 2022-05-20 LAB
ANION GAP SERPL CALC-SCNC: 10 MMOL/L (ref 7–16)
BACTERIA UR CULT: NORMAL
BASOPHILS # BLD AUTO: 0.4 % (ref 0–1.8)
BASOPHILS # BLD: 0.04 K/UL (ref 0–0.12)
BUN SERPL-MCNC: 10 MG/DL (ref 8–22)
CALCIUM SERPL-MCNC: 7.5 MG/DL (ref 8.5–10.5)
CHLORIDE SERPL-SCNC: 108 MMOL/L (ref 96–112)
CK SERPL-CCNC: ABNORMAL U/L (ref 0–154)
CO2 SERPL-SCNC: 21 MMOL/L (ref 20–33)
CREAT SERPL-MCNC: 1.61 MG/DL (ref 0.5–1.4)
EOSINOPHIL # BLD AUTO: 0.19 K/UL (ref 0–0.51)
EOSINOPHIL NFR BLD: 2.1 % (ref 0–6.9)
ERYTHROCYTE [DISTWIDTH] IN BLOOD BY AUTOMATED COUNT: 38 FL (ref 35.9–50)
GFR SERPLBLD CREATININE-BSD FMLA CKD-EPI: 58 ML/MIN/1.73 M 2
GLUCOSE BLD STRIP.AUTO-MCNC: 108 MG/DL (ref 65–99)
GLUCOSE BLD STRIP.AUTO-MCNC: 97 MG/DL (ref 65–99)
GLUCOSE SERPL-MCNC: 96 MG/DL (ref 65–99)
HCT VFR BLD AUTO: 33.1 % (ref 42–52)
HGB BLD-MCNC: 11.5 G/DL (ref 14–18)
IMM GRANULOCYTES # BLD AUTO: 0.03 K/UL (ref 0–0.11)
IMM GRANULOCYTES NFR BLD AUTO: 0.3 % (ref 0–0.9)
LYMPHOCYTES # BLD AUTO: 1.52 K/UL (ref 1–4.8)
LYMPHOCYTES NFR BLD: 16.8 % (ref 22–41)
MAGNESIUM SERPL-MCNC: 2.2 MG/DL (ref 1.5–2.5)
MCH RBC QN AUTO: 30.3 PG (ref 27–33)
MCHC RBC AUTO-ENTMCNC: 34.7 G/DL (ref 33.7–35.3)
MCV RBC AUTO: 87.3 FL (ref 81.4–97.8)
MONOCYTES # BLD AUTO: 0.6 K/UL (ref 0–0.85)
MONOCYTES NFR BLD AUTO: 6.6 % (ref 0–13.4)
NEUTROPHILS # BLD AUTO: 6.69 K/UL (ref 1.82–7.42)
NEUTROPHILS NFR BLD: 73.8 % (ref 44–72)
NRBC # BLD AUTO: 0 K/UL
NRBC BLD-RTO: 0 /100 WBC
PHOSPHATE SERPL-MCNC: 3.8 MG/DL (ref 2.5–4.5)
PLATELET # BLD AUTO: 176 K/UL (ref 164–446)
PMV BLD AUTO: 10.1 FL (ref 9–12.9)
POTASSIUM SERPL-SCNC: 3.4 MMOL/L (ref 3.6–5.5)
RBC # BLD AUTO: 3.79 M/UL (ref 4.7–6.1)
SIGNIFICANT IND 70042: NORMAL
SITE SITE: NORMAL
SODIUM SERPL-SCNC: 139 MMOL/L (ref 135–145)
SOURCE SOURCE: NORMAL
WBC # BLD AUTO: 9.1 K/UL (ref 4.8–10.8)

## 2022-05-20 PROCEDURE — 80048 BASIC METABOLIC PNL TOTAL CA: CPT

## 2022-05-20 PROCEDURE — 700102 HCHG RX REV CODE 250 W/ 637 OVERRIDE(OP): Performed by: HOSPITALIST

## 2022-05-20 PROCEDURE — A9270 NON-COVERED ITEM OR SERVICE: HCPCS | Performed by: INTERNAL MEDICINE

## 2022-05-20 PROCEDURE — 700102 HCHG RX REV CODE 250 W/ 637 OVERRIDE(OP): Performed by: INTERNAL MEDICINE

## 2022-05-20 PROCEDURE — 99223 1ST HOSP IP/OBS HIGH 75: CPT | Performed by: HOSPITALIST

## 2022-05-20 PROCEDURE — 700111 HCHG RX REV CODE 636 W/ 250 OVERRIDE (IP): Performed by: PSYCHIATRY & NEUROLOGY

## 2022-05-20 PROCEDURE — 84100 ASSAY OF PHOSPHORUS: CPT

## 2022-05-20 PROCEDURE — 700105 HCHG RX REV CODE 258: Performed by: INTERNAL MEDICINE

## 2022-05-20 PROCEDURE — 700111 HCHG RX REV CODE 636 W/ 250 OVERRIDE (IP): Performed by: INTERNAL MEDICINE

## 2022-05-20 PROCEDURE — 85025 COMPLETE CBC W/AUTO DIFF WBC: CPT

## 2022-05-20 PROCEDURE — 770020 HCHG ROOM/CARE - TELE (206)

## 2022-05-20 PROCEDURE — 83735 ASSAY OF MAGNESIUM: CPT

## 2022-05-20 PROCEDURE — C9254 INJECTION, LACOSAMIDE: HCPCS | Performed by: PSYCHIATRY & NEUROLOGY

## 2022-05-20 PROCEDURE — 82550 ASSAY OF CK (CPK): CPT

## 2022-05-20 PROCEDURE — A9270 NON-COVERED ITEM OR SERVICE: HCPCS | Performed by: HOSPITALIST

## 2022-05-20 RX ORDER — POTASSIUM CHLORIDE 20 MEQ/1
60 TABLET, EXTENDED RELEASE ORAL ONCE
Status: COMPLETED | OUTPATIENT
Start: 2022-05-20 | End: 2022-05-20

## 2022-05-20 RX ORDER — SODIUM CHLORIDE, SODIUM LACTATE, POTASSIUM CHLORIDE, CALCIUM CHLORIDE 600; 310; 30; 20 MG/100ML; MG/100ML; MG/100ML; MG/100ML
INJECTION, SOLUTION INTRAVENOUS CONTINUOUS
Status: DISCONTINUED | OUTPATIENT
Start: 2022-05-20 | End: 2022-05-24

## 2022-05-20 RX ADMIN — SODIUM CHLORIDE: 9 INJECTION, SOLUTION INTRAVENOUS at 00:00

## 2022-05-20 RX ADMIN — ACETAMINOPHEN 650 MG: 325 TABLET ORAL at 20:37

## 2022-05-20 RX ADMIN — LACOSAMIDE 200 MG: 10 INJECTION INTRAVENOUS at 20:28

## 2022-05-20 RX ADMIN — SODIUM CHLORIDE, POTASSIUM CHLORIDE, SODIUM LACTATE AND CALCIUM CHLORIDE: 600; 310; 30; 20 INJECTION, SOLUTION INTRAVENOUS at 20:28

## 2022-05-20 RX ADMIN — LEVETIRACETAM 1500 MG: 100 INJECTION, SOLUTION INTRAVENOUS at 18:37

## 2022-05-20 RX ADMIN — LACOSAMIDE 200 MG: 10 INJECTION INTRAVENOUS at 06:48

## 2022-05-20 RX ADMIN — ACETAMINOPHEN 650 MG: 325 TABLET ORAL at 01:34

## 2022-05-20 RX ADMIN — RIVAROXABAN 10 MG: 10 TABLET, FILM COATED ORAL at 18:37

## 2022-05-20 RX ADMIN — POTASSIUM CHLORIDE 60 MEQ: 1500 TABLET, EXTENDED RELEASE ORAL at 09:35

## 2022-05-20 RX ADMIN — SODIUM CHLORIDE: 9 INJECTION, SOLUTION INTRAVENOUS at 03:30

## 2022-05-20 RX ADMIN — SODIUM CHLORIDE, POTASSIUM CHLORIDE, SODIUM LACTATE AND CALCIUM CHLORIDE: 600; 310; 30; 20 INJECTION, SOLUTION INTRAVENOUS at 06:50

## 2022-05-20 RX ADMIN — LEVETIRACETAM 1500 MG: 100 INJECTION, SOLUTION INTRAVENOUS at 06:30

## 2022-05-20 RX ADMIN — SENNOSIDES AND DOCUSATE SODIUM 2 TABLET: 50; 8.6 TABLET ORAL at 18:37

## 2022-05-20 RX ADMIN — SODIUM CHLORIDE, POTASSIUM CHLORIDE, SODIUM LACTATE AND CALCIUM CHLORIDE: 600; 310; 30; 20 INJECTION, SOLUTION INTRAVENOUS at 15:24

## 2022-05-20 ASSESSMENT — PATIENT HEALTH QUESTIONNAIRE - PHQ9
2. FEELING DOWN, DEPRESSED, IRRITABLE, OR HOPELESS: NOT AT ALL
SUM OF ALL RESPONSES TO PHQ9 QUESTIONS 1 AND 2: 0
1. LITTLE INTEREST OR PLEASURE IN DOING THINGS: NOT AT ALL

## 2022-05-20 ASSESSMENT — FIBROSIS 4 INDEX
FIB4 SCORE: 0.86
FIB4 SCORE: 0.86

## 2022-05-20 ASSESSMENT — PAIN DESCRIPTION - PAIN TYPE
TYPE: ACUTE PAIN

## 2022-05-20 ASSESSMENT — ENCOUNTER SYMPTOMS
FEVER: 0
ROS GI COMMENTS: TOLERATING A DIET
SHORTNESS OF BREATH: 0
CHILLS: 0
MEMORY LOSS: 1

## 2022-05-20 NOTE — CARE PLAN
The patient is Stable - Low risk of patient condition declining or worsening    Shift Goals  Clinical Goals: no neuro decline; pt will tolerate ADLs; stable VS  Patient Goals: restful night;pain well managed    Progress made toward(s) clinical / shift goals:    Calling appropriately/ no neuro deficit noted at this time aside from lingering fatigue. Up w standby assist and tolerating well Stable VS. Did place pt on 2lpm per NC due to desaturation to 88% on room air while sleeping.   Pt reports moderate r shoulder and general achiness is well controlled with APAP.

## 2022-05-20 NOTE — ASSESSMENT & PLAN NOTE
CPK remains over 22,000  IV lactated ringers + bicarb drip  Follow urine output  Check CPK in the morning  Follow renal function panel in the morning

## 2022-05-20 NOTE — CARE PLAN
The patient is Watcher - Medium risk of patient condition declining or worsening    Shift Goals  Clinical Goals: rest, IVF, meals, OOB  Patient Goals: rest, IVF, meals, OOB  Family Goals: pt will get better and be dc'd    Progress made toward(s) clinical / shift goals:      Problem: Pain - Standard  Goal: Alleviation of pain or a reduction in pain to the patient’s comfort goal  Outcome: Progressing     Problem: Knowledge Deficit - Standard  Goal: Patient and family/care givers will demonstrate understanding of plan of care, disease process/condition, diagnostic tests and medications  Outcome: Progressing       Patient is not progressing towards the following goals:

## 2022-05-20 NOTE — DISCHARGE PLANNING
Case Management Discharge Planning    Admission Date: 5/18/2022  GMLOS: 4.3  ALOS: 2    6-Clicks ADL Score:    6-Clicks Mobility Score:        Anticipated Discharge Dispo:      DME Needed: No    Action(s) Taken: OTHER    Escalations Completed: None    Medically Clear: No    Next Steps: Lsw spoke to MD. Lsw provided DMV MD referral as pt has seizure hx and drives a vehicle.  Lsw faxed the completed signed DMV documents and [facility] received fax confirmation.    Barriers to Discharge: Medical clearance    Is the patient up for discharge tomorrow: No

## 2022-05-20 NOTE — ASSESSMENT & PLAN NOTE
History of seizures since 2009  He had not been taking the 1,500 mg BID as prescribed and had been taking 1,500 mg daily  Continue p.o. Keppra          Neurology on case  Drivers license will need to be revoked and DMV paperwork was filled out  EEG is complete

## 2022-05-20 NOTE — PROGRESS NOTES
4 Eyes Skin Assessment Complete    Head WDL  Ears WDL  Nose WDL  Mouth WDL  Neck WDL  Breast/Chest WDL  Shoulder Blades WDL  Spine WDL  (R) Arm/Elbow/Hand WDL  (L) Arm/Elbow/Hand WDL  Abdomen WDL  Groin WDL  Scrotum/Coccyx/Buttocks WDL  (R) Leg WDL  (L) Leg WDL  (R) Heel/Foot/Toe WDL  (L) Heel/Foot/Toe WDL          Devices In Places Tele Box      Interventions In Place N/A    Possible Skin Injury No    Pictures Uploaded Into Epic N/A  Wound Consult Placed N/A  RN Wound Prevention Protocol Ordered No

## 2022-05-20 NOTE — CONSULTS
Hospital Medicine Consultation    Date of Service  5/20/2022    Referring Physician  Robert Avelar M.D.    Consulting Physician  Ariel Forman M.D.    Reason for Consultation  rhabdomyolysis    History of Presenting Illness  32 y.o. male who presented 5/18/2022 with seizures.  Mr. Ny has a plast medical history of seizure disorder since 2009 last seizure had been 2012 supposed to be on Keppra 1,500 mg BID though had self-tapered to 1,500 mg daily. He was found by his partner to have a seizure and had a seizure witnessed by paramedics and another one in the ER. He was given 2 mg IV ativan and he developed respiratory depression with respiratory failure followed by PEA arrest.  He had CPR with epinephrine and was intubated with ICU admission.  In the ER he was also found to have a right posterior shoulder dislocation was reduced Dr. Goel, orthopedic surgery recommended 2 to 3 weeks of immobilization and then began range of motion's follow-up as an outpatient.  He was found to have rhabdomyolysis with CPK over 22,000 and has been treated with IV fluids.  Neurology was consulted and followed.  An EEG revealed background slowing with runs of generalized frontal activity suspiciously epileptiform.    5/20/2022: Patient seen and evaluated in the ICU.  His partner is at bedside.  Mr. Ny admits that he was supposed to be on 1500 mg Keppra twice a day but had been taking only once a day.  I filled out the [facility] paperwork to revoke his license for at least 3 months until cleared by neurology.    Review of Systems  Review of Systems   Constitutional: Negative for chills and fever.   Respiratory: Negative for shortness of breath.    Cardiovascular: Negative for chest pain.   Gastrointestinal:        Tolerating a diet   Psychiatric/Behavioral: Positive for memory loss.   All other systems reviewed and are negative.      Past Medical History   has a past medical history of History of mechanical ventilation  (5/18/2022), Leukocytosis (leucocytosis) (5/18/2022), and Seizure (HCC).    Surgical History  none    Family History  No family history of seizures    Social History   occasional alcohol    Medications  Prior to Admission Medications   Prescriptions Last Dose Informant Patient Reported? Taking?   Calcium Carbonate-Vit D-Min (CALCIUM 1200 PO) 5/18/2022 at 0900 Patient Yes No   Sig: Take 1 Tablet by mouth every day.   Levetiracetam (KEPPRA XR) 750 MG TABLET SR 24 HR 5/18/2022 at 0900 Patient Yes No   Sig: Take 1,500 mg by mouth 2 times a day.   multivitamin (THERAGRAN) TABS 5/18/2022 at 0900 Patient Yes No   Sig: Take 1 Tab by mouth every day.      Facility-Administered Medications: None       Allergies  No Known Allergies    Physical Exam  Temp:  [36.3 °C (97.3 °F)-37.1 °C (98.7 °F)] 36.7 °C (98.1 °F)  Pulse:  [] 93  Resp:  [14-32] 29  BP: (109-135)/(59-90) 127/81  SpO2:  [89 %-100 %] 89 %    Physical Exam  Vitals and nursing note reviewed.   Constitutional:       General: He is not in acute distress.     Appearance: He is not toxic-appearing.   HENT:      Head: Normocephalic and atraumatic.      Mouth/Throat:      Mouth: Mucous membranes are dry.      Pharynx: Oropharynx is clear.   Eyes:      General: No scleral icterus.     Conjunctiva/sclera: Conjunctivae normal.   Cardiovascular:      Rate and Rhythm: Normal rate and regular rhythm.      Heart sounds: No murmur heard.  Pulmonary:      Effort: Pulmonary effort is normal.      Breath sounds: Normal breath sounds.   Abdominal:      General: There is no distension.      Tenderness: There is no abdominal tenderness.   Musculoskeletal:      Cervical back: Normal range of motion and neck supple.      Right lower leg: No edema.      Left lower leg: No edema.      Comments: Right arm in a sling   Neurological:      General: No focal deficit present.      Mental Status: He is alert and oriented to person, place, and time.   Psychiatric:         Mood and Affect: Mood  normal.         Behavior: Behavior normal.         Thought Content: Thought content normal.         Judgment: Judgment normal.         Fluids      Laboratory  Recent Labs     05/18/22  1327 05/19/22  0513 05/20/22  0337   WBC 35.7* 11.8* 9.1   RBC 5.34 3.93* 3.79*   HEMOGLOBIN 16.3 11.9* 11.5*   HEMATOCRIT 48.4 34.5* 33.1*   MCV 90.6 87.8 87.3   MCH 30.5 30.3 30.3   MCHC 33.7 34.5 34.7   RDW 38.6 37.9 38.0   PLATELETCT 452* 213 176   MPV 10.5 10.1 10.1     Recent Labs     05/18/22  2102 05/19/22  0513 05/20/22  0337   SODIUM 140 139 139   POTASSIUM 4.2 3.6 3.4*   CHLORIDE 108 109 108   CO2 15* 19* 21   GLUCOSE 100* 103* 96   BUN 16 17 10   CREATININE 1.65* 1.94* 1.61*   CALCIUM 7.6* 7.7* 7.5*              Recent Labs     05/18/22  1753   TRIGLYCERIDE 1002*        Imaging  EC-ECHOCARDIOGRAM COMPLETE W/O CONT   Final Result      DX-CHEST-PORTABLE (1 VIEW)   Final Result         1.  Left basilar atelectasis or early infiltrates.   2.  Trace left pleural effusion      DX-ABDOMEN FOR TUBE PLACEMENT   Final Result         1.  Nonspecific bowel gas pattern.   2.  Nasogastric tube tip terminates overlying the expected location of the gastric antrum.   3.  Left basilar atelectasis or early infiltrate.      [id number] VIEW   Final Result      NG tube turns at the GE junction with tip in the mid to distal esophagus.                  DX-CHEST-PORTABLE (1 VIEW)   Final Result         1.  Endotracheal tube is noted in place with tip at the lower end of the clavicles.      2.  NG tube turns at the level of the GE junction with the tip located in the mid to distal esophagus.      3.  No new infiltrates or consolidations.      DX-SHOULDER 2+ RIGHT   Final Result      1.  Closed reduction of previously seen posterior shoulder dislocation.   2.  Comminuted and displaced humeral head fractures.   3.  Small presumed fracture of the superior glenoid.      CT-SHOULDER WITH PLUS RECONS RIGHT   Final Result      1.  Posterior shoulder  dislocation with associated comminuted reverse Hill-Sachs lesion. Multiple fracture fragments within the joint space.   2.  Likely tiny fracture fragment of the superior glenoid rim.   3.  Associated glenohumeral joint effusion with tiny focus of gas of uncertain source.      CT-ABDOMEN-PELVIS WITH   Final Result      No acute inflammation in the abdomen or pelvis.      DX-CHEST-PORTABLE (1 VIEW)   Final Result      No acute cardiac or pulmonary abnormalities are identified.      DX-SHOULDER 2+ RIGHT   Final Result      Calcification in the region of the lower glenohumeral joint recess could be a fracture fragment or could be related to synovial osteochondromatosis. Other inflammatory conditions such as pigmented villonodular synovitis might also cause this appearance.    Further assessment could be performed with CT or MRI as determined clinically.      CT-HEAD W/O   Final Result         NO ACUTE ABNORMALITIES ARE NOTED ON CT SCAN OF THE HEAD.                   Assessment/Plan  * Status epilepticus (HCC)- (present on admission)  Assessment & Plan  History of seizures since 2009  He had not been taking the 1,500 mg BID as prescribed and had been taking 1,500 mg daily  IV Keppra and Vimpat  Neurology consulted.   Drivers license will need to be revoked and DMV paperwork was filled out  EEG was done    Cardiac arrest ([facility])- (present on admission)  Assessment & Plan  In the setting of post-ictal condition and IV benzodiazepines   He has remained in a sinus rhythm  Echocardiogram is unremarkable with a possible bicuspid valve    MINNA (acute kidney injury) ([facility])- (present on admission)  Assessment & Plan  In the setting of rhabdomyolysis  Cr went up to 1.9  Follow urine output  BMP ordered for the morning    Rhabdomyolysis- (present on admission)  Assessment & Plan  CPK remains over 22,000  IV lactated ringers   Follow urine output  Check CPK in the morning  Follow renal function panel in the morning    Acute respiratory  failure with hypoxia (HCC)- (present on admission)  Assessment & Plan  Requiring intubation    Lactic acidosis- (present on admission)  Assessment & Plan  Secondary to seizure    Dislocation of right shoulder joint- (present on admission)  Assessment & Plan  Sling and limited ROM 3 weeks  Dr. Goel ortho will f/u outpatient.    Epilepsy (HCC)- (present on admission)  Assessment & Plan  Last seizure had been 2012

## 2022-05-20 NOTE — ASSESSMENT & PLAN NOTE
In the setting of post-ictal condition and IV benzodiazepines   He has remained in a sinus rhythm  Echocardiogram is unremarkable with a possible bicuspid valve

## 2022-05-20 NOTE — PROGRESS NOTES
[phone]    Since transfer to unit,     LR @ 200 mL/hr    SBP 140s    • AOx4  • Assist - independent  • Tele - SR  • Diet - regular  • Full code  • PIV R) AC + L) hand

## 2022-05-21 LAB
ANION GAP SERPL CALC-SCNC: 11 MMOL/L (ref 7–16)
BACTERIA CSF CULT: NORMAL
BUN SERPL-MCNC: 9 MG/DL (ref 8–22)
CALCIUM SERPL-MCNC: 8.8 MG/DL (ref 8.5–10.5)
CHLORIDE SERPL-SCNC: 105 MMOL/L (ref 96–112)
CK SERPL-CCNC: ABNORMAL U/L (ref 0–154)
CO2 SERPL-SCNC: 23 MMOL/L (ref 20–33)
CREAT SERPL-MCNC: 1.17 MG/DL (ref 0.5–1.4)
GFR SERPLBLD CREATININE-BSD FMLA CKD-EPI: 85 ML/MIN/1.73 M 2
GLUCOSE SERPL-MCNC: 103 MG/DL (ref 65–99)
GRAM STN SPEC: NORMAL
HSV1+2 IGM CSF-ACNC: 0.21 IV
POTASSIUM SERPL-SCNC: 3.9 MMOL/L (ref 3.6–5.5)
SIGNIFICANT IND 70042: NORMAL
SITE SITE: NORMAL
SODIUM SERPL-SCNC: 139 MMOL/L (ref 135–145)
SOURCE SOURCE: NORMAL

## 2022-05-21 PROCEDURE — C9254 INJECTION, LACOSAMIDE: HCPCS | Performed by: PSYCHIATRY & NEUROLOGY

## 2022-05-21 PROCEDURE — 700105 HCHG RX REV CODE 258: Performed by: HOSPITALIST

## 2022-05-21 PROCEDURE — 36415 COLL VENOUS BLD VENIPUNCTURE: CPT

## 2022-05-21 PROCEDURE — A9270 NON-COVERED ITEM OR SERVICE: HCPCS | Performed by: INTERNAL MEDICINE

## 2022-05-21 PROCEDURE — 700105 HCHG RX REV CODE 258: Performed by: INTERNAL MEDICINE

## 2022-05-21 PROCEDURE — 770020 HCHG ROOM/CARE - TELE (206)

## 2022-05-21 PROCEDURE — 99232 SBSQ HOSP IP/OBS MODERATE 35: CPT | Performed by: PSYCHIATRY & NEUROLOGY

## 2022-05-21 PROCEDURE — A9270 NON-COVERED ITEM OR SERVICE: HCPCS | Performed by: HOSPITALIST

## 2022-05-21 PROCEDURE — 700102 HCHG RX REV CODE 250 W/ 637 OVERRIDE(OP): Performed by: HOSPITALIST

## 2022-05-21 PROCEDURE — 700102 HCHG RX REV CODE 250 W/ 637 OVERRIDE(OP): Performed by: INTERNAL MEDICINE

## 2022-05-21 PROCEDURE — 82550 ASSAY OF CK (CPK): CPT

## 2022-05-21 PROCEDURE — 700111 HCHG RX REV CODE 636 W/ 250 OVERRIDE (IP): Performed by: HOSPITALIST

## 2022-05-21 PROCEDURE — A9270 NON-COVERED ITEM OR SERVICE: HCPCS

## 2022-05-21 PROCEDURE — 80048 BASIC METABOLIC PNL TOTAL CA: CPT

## 2022-05-21 PROCEDURE — 700111 HCHG RX REV CODE 636 W/ 250 OVERRIDE (IP): Performed by: INTERNAL MEDICINE

## 2022-05-21 PROCEDURE — 99233 SBSQ HOSP IP/OBS HIGH 50: CPT | Performed by: HOSPITALIST

## 2022-05-21 PROCEDURE — 700102 HCHG RX REV CODE 250 W/ 637 OVERRIDE(OP)

## 2022-05-21 PROCEDURE — 700111 HCHG RX REV CODE 636 W/ 250 OVERRIDE (IP): Performed by: PSYCHIATRY & NEUROLOGY

## 2022-05-21 RX ORDER — LEVETIRACETAM 500 MG/1
1500 TABLET ORAL 2 TIMES DAILY
Status: DISCONTINUED | OUTPATIENT
Start: 2022-05-21 | End: 2022-05-24 | Stop reason: HOSPADM

## 2022-05-21 RX ORDER — LACOSAMIDE 100 MG/1
200 TABLET ORAL 2 TIMES DAILY
Status: DISCONTINUED | OUTPATIENT
Start: 2022-05-21 | End: 2022-05-21

## 2022-05-21 RX ORDER — CALCIUM CARBONATE 500 MG/1
1000 TABLET, CHEWABLE ORAL 4 TIMES DAILY PRN
Status: DISCONTINUED | OUTPATIENT
Start: 2022-05-21 | End: 2022-05-24 | Stop reason: HOSPADM

## 2022-05-21 RX ADMIN — RIVAROXABAN 10 MG: 10 TABLET, FILM COATED ORAL at 17:17

## 2022-05-21 RX ADMIN — ACETAMINOPHEN 650 MG: 325 TABLET ORAL at 13:06

## 2022-05-21 RX ADMIN — LACOSAMIDE 200 MG: 10 INJECTION INTRAVENOUS at 05:46

## 2022-05-21 RX ADMIN — CALCIUM CARBONATE 1000 MG: 500 TABLET, CHEWABLE ORAL at 23:01

## 2022-05-21 RX ADMIN — SENNOSIDES AND DOCUSATE SODIUM 2 TABLET: 50; 8.6 TABLET ORAL at 05:46

## 2022-05-21 RX ADMIN — CALCIUM CARBONATE 1000 MG: 500 TABLET, CHEWABLE ORAL at 06:58

## 2022-05-21 RX ADMIN — LEVETIRACETAM 1500 MG: 500 TABLET, FILM COATED ORAL at 17:17

## 2022-05-21 RX ADMIN — LEVETIRACETAM 1500 MG: 100 INJECTION, SOLUTION INTRAVENOUS at 05:46

## 2022-05-21 RX ADMIN — SODIUM BICARBONATE: 84 INJECTION, SOLUTION INTRAVENOUS at 09:15

## 2022-05-21 RX ADMIN — SODIUM CHLORIDE, POTASSIUM CHLORIDE, SODIUM LACTATE AND CALCIUM CHLORIDE: 600; 310; 30; 20 INJECTION, SOLUTION INTRAVENOUS at 03:27

## 2022-05-21 RX ADMIN — SODIUM CHLORIDE, POTASSIUM CHLORIDE, SODIUM LACTATE AND CALCIUM CHLORIDE: 600; 310; 30; 20 INJECTION, SOLUTION INTRAVENOUS at 21:40

## 2022-05-21 ASSESSMENT — ENCOUNTER SYMPTOMS
EYES NEGATIVE: 1
GASTROINTESTINAL NEGATIVE: 1
CARDIOVASCULAR NEGATIVE: 1
NEUROLOGICAL NEGATIVE: 1
MYALGIAS: 1
PSYCHIATRIC NEGATIVE: 1

## 2022-05-21 ASSESSMENT — PAIN DESCRIPTION - PAIN TYPE
TYPE: ACUTE PAIN
TYPE: ACUTE PAIN

## 2022-05-21 ASSESSMENT — FIBROSIS 4 INDEX: FIB4 SCORE: 0.86

## 2022-05-21 ASSESSMENT — PATIENT HEALTH QUESTIONNAIRE - PHQ9
2. FEELING DOWN, DEPRESSED, IRRITABLE, OR HOPELESS: NOT AT ALL
1. LITTLE INTEREST OR PLEASURE IN DOING THINGS: NOT AT ALL
SUM OF ALL RESPONSES TO PHQ9 QUESTIONS 1 AND 2: 0

## 2022-05-21 NOTE — PROGRESS NOTES
NEUROLOGY PROGRESS NOTE      BACKGROUND:    32 y.o. male was admitted on 5/18/2022  1:09 PM for Cardiac arrest (HCC) [I46.9].  He is being followed by Neurology for intractable seizure.    SUBJECTIVE:   He is awake, alert and fully oriented.  Family are at bedside.  No seizure activity reported.        VITALS:  Vitals:    05/21/22 0000 05/21/22 0400 05/21/22 0810 05/21/22 1105   BP:   (!) 132/100 (!) 142/95   Pulse: 85 96 87 79   Resp: 16 20 19 18   Temp: 37 °C (98.6 °F) 37.1 °C (98.7 °F) 37.2 °C (99 °F) 37.2 °C (99 °F)   TempSrc: Temporal Temporal Temporal Temporal   SpO2: 93% 93% 96% 96%   Weight:       Height:           NEUROLOGICAL EXAM:   He is awake, alert and fully oriented.  Speech and memory within normal limits.  Facial motor is intact.  Facial sensation is intact to V1, V2 and V3 distribution.  Pupils are equal and reactive.  Extraocular movement: He has some disconjugate gaze and has partial medial rectus palsy on the left.  Motor examination revealed normal strength to direct testing of both upper and lower extremity.  Sensation is intact to light touch and temperature.  Coordination is intact to finger-to-nose testing.  Deep tendon reflexes are 1+ and equal.  Gait was deferred.    OBJECTIVE:    NEUROIMAGING:    EC-ECHOCARDIOGRAM COMPLETE W/O CONT   Final Result      DX-CHEST-PORTABLE (1 VIEW)   Final Result         1.  Left basilar atelectasis or early infiltrates.   2.  Trace left pleural effusion      DX-ABDOMEN FOR TUBE PLACEMENT   Final Result         1.  Nonspecific bowel gas pattern.   2.  Nasogastric tube tip terminates overlying the expected location of the gastric antrum.   3.  Left basilar atelectasis or early infiltrate.      [id number]-1 VIEW   Final Result      NG tube turns at the GE junction with tip in the mid to distal esophagus.                  DX-CHEST-PORTABLE (1 VIEW)   Final Result         1.  Endotracheal tube is noted in place with tip at the lower end of the clavicles.      2.   NG tube turns at the level of the GE junction with the tip located in the mid to distal esophagus.      3.  No new infiltrates or consolidations.      DX-SHOULDER 2+ RIGHT   Final Result      1.  Closed reduction of previously seen posterior shoulder dislocation.   2.  Comminuted and displaced humeral head fractures.   3.  Small presumed fracture of the superior glenoid.      CT-SHOULDER WITH PLUS RECONS RIGHT   Final Result      1.  Posterior shoulder dislocation with associated comminuted reverse Hill-Sachs lesion. Multiple fracture fragments within the joint space.   2.  Likely tiny fracture fragment of the superior glenoid rim.   3.  Associated glenohumeral joint effusion with tiny focus of gas of uncertain source.      CT-ABDOMEN-PELVIS WITH   Final Result      No acute inflammation in the abdomen or pelvis.      DX-CHEST-PORTABLE (1 VIEW)   Final Result      No acute cardiac or pulmonary abnormalities are identified.      DX-SHOULDER 2+ RIGHT   Final Result      Calcification in the region of the lower glenohumeral joint recess could be a fracture fragment or could be related to synovial osteochondromatosis. Other inflammatory conditions such as pigmented villonodular synovitis might also cause this appearance.    Further assessment could be performed with CT or MRI as determined clinically.      CT-HEAD W/O   Final Result         NO ACUTE ABNORMALITIES ARE NOTED ON CT SCAN OF THE HEAD.                   MEDICATIONS:  Current Facility-Administered Medications   Medication Dose Route Frequency Provider Last Rate Last Admin   • calcium carbonate (TUMS) chewable tab 1,000 mg  1,000 mg Oral 4X/DAY PRN ALEJANDRO KeithP.R.N.   1,000 mg at 05/21/22 0658   • levETIRAcetam (KEPPRA) tablet 1,500 mg  1,500 mg Oral BID Pardeep Whitehead M.D.       • lacosamide (VIMPAT) tablet 200 mg  200 mg Oral BID Pardeep Whitehead M.D.       • sodium bicarbonate 75 mEq in 1/2 NS 1,000 mL infusion   Intravenous Continuous Pardeep  KAMRAN Whitehead M.D. 42 mL/hr at 05/21/22 0915 New Bag at 05/21/22 0915   • lactated ringers infusion   Intravenous Continuous Pardeep Whitehead M.D. 150 mL/hr at 05/21/22 0853 Rate Change at 05/21/22 0853   • rivaroxaban (XARELTO) tablet 10 mg  10 mg Oral DAILY AT 1800 Ariel Forman M.D.   10 mg at 05/20/22 1837   • acetaminophen (Tylenol) tablet 650 mg  650 mg Enteral Tube Q4HRS PRN Robert Avelar M.D.   650 mg at 05/21/22 1306   • senna-docusate (PERICOLACE or SENOKOT S) 8.6-50 MG per tablet 2 Tablet  2 Tablet Enteral Tube BID Esha Reaves M.D.   2 Tablet at 05/21/22 0546    And   • polyethylene glycol/lytes (MIRALAX) PACKET 1 Packet  1 Packet Enteral Tube QDAY PRHORACIO Reaves M.D.        And   • magnesium hydroxide (MILK OF MAGNESIA) suspension 30 mL  30 mL Enteral Tube QDAY PRN Esha Reaves M.D.        And   • bisacodyl (DULCOLAX) suppository 10 mg  10 mg Rectal QDAY PRN Esha Reaves M.D.       • Respiratory Therapy Consult   Nebulization Continuous RT Esha Reaves M.D.           LABS:  Recent Labs     05/19/22  1315 05/20/22  0337 05/21/22  0340   CPKTOTAL 09881* >62940* >20890*     Recent Labs     05/19/22  0513 05/20/22  0337   WBC 11.8* 9.1   RBC 3.93* 3.79*   HEMOGLOBIN 11.9* 11.5*   HEMATOCRIT 34.5* 33.1*   MCV 87.8 87.3   MCH 30.3 30.3   MCHC 34.5 34.7   RDW 37.9 38.0   PLATELETCT 213 176   MPV 10.1 10.1     Recent Labs     05/19/22  0513 05/19/22  1315 05/20/22  0337 05/21/22  0340   SODIUM 139  --  139 139   POTASSIUM 3.6  --  3.4* 3.9   CHLORIDE 109  --  108 105   CO2 19*  --  21 23   GLUCOSE 103*  --  96 103*   BUN 17  --  10 9   CPKTOTAL 5174* 11634* >81516* >85157*     No results found for: INR  No results found for: POCINR  Lab Results   Component Value Date/Time    CREATININE 1.94 (H) 05/19/2022 0513     No results found for: IFAFRICA, IFNOTAFR      ASSESSMENT AND PLAN:  32 y.o. male with history of seizure disorder currently on Keppra 1500 mg twice a day  who was brought to emergency room for evaluation of intractable seizure.  Apparently patient has not had seizure for almost 10 years on current dose of Keppra.   Initially in the emergency room he had brief cardiac arrest requiring CPR and intubation.  He has not had any seizures since admission.  His EEG was abnormal due to diffuse slowing of background activity suggestive of encephalopathy, however there was no definitive seizure.  I have added Vimpat 200 mg twice a day given intractable seizure on Keppra 1500 mg twice a day.  Continue with seizure precaution.  It became clear that patient was not taking Keppra 1500 mg twice a day and had been taking 1500 mg once a day.  We will resume Keppra at 1500 mg twice a day and will discontinue Vimpat.  Neurology will sign off, please call me if there is any question.

## 2022-05-21 NOTE — CARE PLAN
The patient is Stable - Low risk of patient condition declining or worsening    Shift Goals  Clinical Goals: IVF; VSS  Patient Goals: Rest; decreased pain  Family Goals: Go home    Progress made toward(s) clinical / shift goals:    Problem: Pain - Standard  Goal: Alleviation of pain or a reduction in pain to the patient’s comfort goal  Outcome: Progressing  Flowsheets (Taken 5/20/2022 2140)  Pain Rating Scale (NPRS): 0  Note: Tylenol given for right shoulder pain     Problem: Respiratory  Goal: Patient will achieve/maintain optimum respiratory ventilation and gas exchange  Outcome: Progressing  Flowsheets (Taken 5/21/2022 0000 by Penny Moralez, C.N.A.)  O2 Delivery Device: None - Room Air  Note: Stable on room air       Patient is not progressing towards the following goals:

## 2022-05-21 NOTE — PROGRESS NOTES
Received patient from dayshift RN. Patient stable , O2 @ RA , VSS, A&O x4 . Call light and personal items within reach. Bed locked and in lowest position. Fall precautions in place. Hourly rounding in place. Tele box verified in place. Will continue to monitor.

## 2022-05-21 NOTE — PROGRESS NOTES
Hospital Medicine Daily Progress Note    Date of Service  5/21/2022    Chief Complaint  Florentino Ny is a 32 y.o. male admitted 5/18/2022 with seizure    Hospital Course  No notes on file    Interval Problem Update  Now on tele    No new seizure    Awake, appropriate    On iv keppra and iv vimpat    cpk elevated at 22, 000    Right arm/shoulder is in sling-he denies pain , while at rest    I have personally seen and examined the patient at bedside. I discussed the plan of care with patient.    Consultants/Specialty  neurology    Code Status  Full Code    Disposition  Patient is not medically cleared for discharge.   Anticipate discharge to to home with close outpatient follow-up.  I have placed the appropriate orders for post-discharge needs.    Review of Systems  Review of Systems   Constitutional: Positive for malaise/fatigue.   HENT: Negative.    Eyes: Negative.    Cardiovascular: Negative.    Gastrointestinal: Negative.    Genitourinary: Negative.    Musculoskeletal: Positive for myalgias.   Skin: Negative.    Neurological: Negative.    Psychiatric/Behavioral: Negative.         Physical Exam  Temp:  [36.6 °C (97.9 °F)-37.4 °C (99.3 °F)] 37.2 °C (99 °F)  Pulse:  [] 87  Resp:  [16-24] 19  BP: (127-151)/() 132/100  SpO2:  [92 %-98 %] 96 %    Physical Exam  Constitutional:       General: He is not in acute distress.     Appearance: He is obese. He is not ill-appearing or toxic-appearing.   HENT:      Head: Normocephalic and atraumatic.      Mouth/Throat:      Mouth: Mucous membranes are moist.   Eyes:      General:         Right eye: No discharge.         Left eye: No discharge.      Extraocular Movements: Extraocular movements intact.      Pupils: Pupils are equal, round, and reactive to light.   Cardiovascular:      Rate and Rhythm: Normal rate.      Heart sounds:     No gallop.   Pulmonary:      Effort: Pulmonary effort is normal. No respiratory distress.      Breath sounds: No stridor. No  wheezing, rhonchi or rales.   Abdominal:      General: Bowel sounds are normal. There is no distension.      Palpations: There is no mass.      Tenderness: There is no guarding.      Hernia: No hernia is present.   Musculoskeletal:         General: Tenderness (mild diffuse) present.      Cervical back: Normal range of motion. No rigidity.   Skin:     Capillary Refill: Capillary refill takes 2 to 3 seconds.      Coloration: Skin is not jaundiced or pale.      Findings: No bruising or erythema.   Neurological:      General: No focal deficit present.      Cranial Nerves: No cranial nerve deficit.      Motor: No weakness.      Gait: Gait normal.         Fluids    Intake/Output Summary (Last 24 hours) at 5/21/2022 0922  Last data filed at 5/21/2022 0400  Gross per 24 hour   Intake 1600 ml   Output --   Net 1600 ml       Laboratory  Recent Labs     05/18/22  1327 05/19/22  0513 05/20/22  0337   WBC 35.7* 11.8* 9.1   RBC 5.34 3.93* 3.79*   HEMOGLOBIN 16.3 11.9* 11.5*   HEMATOCRIT 48.4 34.5* 33.1*   MCV 90.6 87.8 87.3   MCH 30.5 30.3 30.3   MCHC 33.7 34.5 34.7   RDW 38.6 37.9 38.0   PLATELETCT 452* 213 176   MPV 10.5 10.1 10.1     Recent Labs     05/19/22  0513 05/20/22  0337 05/21/22  0340   SODIUM 139 139 139   POTASSIUM 3.6 3.4* 3.9   CHLORIDE 109 108 105   CO2 19* 21 23   GLUCOSE 103* 96 103*   BUN 17 10 9   CREATININE 1.94* 1.61* 1.17   CALCIUM 7.7* 7.5* 8.8             Recent Labs     05/18/22  1753   TRIGLYCERIDE 1002*       Imaging  EC-ECHOCARDIOGRAM COMPLETE W/O CONT   Final Result      DX-CHEST-PORTABLE (1 VIEW)   Final Result         1.  Left basilar atelectasis or early infiltrates.   2.  Trace left pleural effusion      DX-ABDOMEN FOR TUBE PLACEMENT   Final Result         1.  Nonspecific bowel gas pattern.   2.  Nasogastric tube tip terminates overlying the expected location of the gastric antrum.   3.  Left basilar atelectasis or early infiltrate.      AO-[id number]-0 VIEW   Final Result      NG tube turns at the  GE junction with tip in the mid to distal esophagus.                  DX-CHEST-PORTABLE (1 VIEW)   Final Result         1.  Endotracheal tube is noted in place with tip at the lower end of the clavicles.      2.  NG tube turns at the level of the GE junction with the tip located in the mid to distal esophagus.      3.  No new infiltrates or consolidations.      DX-SHOULDER 2+ RIGHT   Final Result      1.  Closed reduction of previously seen posterior shoulder dislocation.   2.  Comminuted and displaced humeral head fractures.   3.  Small presumed fracture of the superior glenoid.      CT-SHOULDER WITH PLUS RECONS RIGHT   Final Result      1.  Posterior shoulder dislocation with associated comminuted reverse Hill-Sachs lesion. Multiple fracture fragments within the joint space.   2.  Likely tiny fracture fragment of the superior glenoid rim.   3.  Associated glenohumeral joint effusion with tiny focus of gas of uncertain source.      CT-ABDOMEN-PELVIS WITH   Final Result      No acute inflammation in the abdomen or pelvis.      DX-CHEST-PORTABLE (1 VIEW)   Final Result      No acute cardiac or pulmonary abnormalities are identified.      DX-SHOULDER 2+ RIGHT   Final Result      Calcification in the region of the lower glenohumeral joint recess could be a fracture fragment or could be related to synovial osteochondromatosis. Other inflammatory conditions such as pigmented villonodular synovitis might also cause this appearance.    Further assessment could be performed with CT or MRI as determined clinically.      CT-HEAD W/O   Final Result         NO ACUTE ABNORMALITIES ARE NOTED ON CT SCAN OF THE HEAD.                    Assessment/Plan  * Status epilepticus (HCC)- (present on admission)  Assessment & Plan  History of seizures since 2009  He had not been taking the 1,500 mg BID as prescribed and had been taking 1,500 mg daily  IV Keppra and Vimpat to po on 5/21  Neurology on case  Drivers license will need to be  revoked and DMV paperwork was filled out  EEG was done    MINNA (acute kidney injury) (HCC)- (present on admission)  Assessment & Plan  In the setting of rhabdomyolysis    Follow urine output  BMP ordered for the morning    Rhabdomyolysis- (present on admission)  Assessment & Plan  CPK remains over 22,000  IV lactated ringers + bicarb drip  Follow urine output  Check CPK in the morning  Follow renal function panel in the morning    Acute respiratory failure with hypoxia (HCC)- (present on admission)  Assessment & Plan  Requiring intubation--> resolved    Lactic acidosis- (present on admission)  Assessment & Plan  Secondary to seizure    Dislocation of right shoulder joint- (present on admission)  Assessment & Plan  Sling and limited ROM 3 weeks  Dr. Goel ortho will f/u outpatient.    Cardiac arrest (HCC)- (present on admission)  Assessment & Plan  In the setting of post-ictal condition and IV benzodiazepines   He has remained in a sinus rhythm  Echocardiogram is unremarkable with a possible bicuspid valve    Epilepsy (HCC)- (present on admission)  Assessment & Plan  Last seizure had been 2012       VTE prophylaxis: SCDs/TEDs    I have performed a physical exam and reviewed and updated ROS and Plan today (5/21/2022). In review of yesterday's note (5/20/2022), there are no changes except as documented above.

## 2022-05-22 LAB
ANION GAP SERPL CALC-SCNC: 12 MMOL/L (ref 7–16)
BUN SERPL-MCNC: 6 MG/DL (ref 8–22)
CALCIUM SERPL-MCNC: 8.9 MG/DL (ref 8.5–10.5)
CHLORIDE SERPL-SCNC: 101 MMOL/L (ref 96–112)
CK SERPL-CCNC: ABNORMAL U/L (ref 0–154)
CO2 SERPL-SCNC: 22 MMOL/L (ref 20–33)
CREAT SERPL-MCNC: 0.94 MG/DL (ref 0.5–1.4)
FLUAV RNA SPEC QL NAA+PROBE: NEGATIVE
FLUBV RNA SPEC QL NAA+PROBE: NEGATIVE
GFR SERPLBLD CREATININE-BSD FMLA CKD-EPI: 110 ML/MIN/1.73 M 2
GLUCOSE SERPL-MCNC: 89 MG/DL (ref 65–99)
POTASSIUM SERPL-SCNC: 3.6 MMOL/L (ref 3.6–5.5)
SARS-COV-2 RNA RESP QL NAA+PROBE: NOTDETECTED
SODIUM SERPL-SCNC: 135 MMOL/L (ref 135–145)
SPECIMEN SOURCE: NORMAL

## 2022-05-22 PROCEDURE — 700102 HCHG RX REV CODE 250 W/ 637 OVERRIDE(OP): Performed by: HOSPITALIST

## 2022-05-22 PROCEDURE — 700111 HCHG RX REV CODE 636 W/ 250 OVERRIDE (IP): Performed by: HOSPITALIST

## 2022-05-22 PROCEDURE — 770020 HCHG ROOM/CARE - TELE (206)

## 2022-05-22 PROCEDURE — 700105 HCHG RX REV CODE 258: Performed by: HOSPITALIST

## 2022-05-22 PROCEDURE — A9270 NON-COVERED ITEM OR SERVICE: HCPCS | Performed by: HOSPITALIST

## 2022-05-22 PROCEDURE — 700102 HCHG RX REV CODE 250 W/ 637 OVERRIDE(OP): Performed by: INTERNAL MEDICINE

## 2022-05-22 PROCEDURE — 99233 SBSQ HOSP IP/OBS HIGH 50: CPT | Performed by: HOSPITALIST

## 2022-05-22 PROCEDURE — A9270 NON-COVERED ITEM OR SERVICE: HCPCS

## 2022-05-22 PROCEDURE — 82550 ASSAY OF CK (CPK): CPT

## 2022-05-22 PROCEDURE — 36415 COLL VENOUS BLD VENIPUNCTURE: CPT

## 2022-05-22 PROCEDURE — A9270 NON-COVERED ITEM OR SERVICE: HCPCS | Performed by: INTERNAL MEDICINE

## 2022-05-22 PROCEDURE — 80048 BASIC METABOLIC PNL TOTAL CA: CPT

## 2022-05-22 PROCEDURE — 700102 HCHG RX REV CODE 250 W/ 637 OVERRIDE(OP)

## 2022-05-22 RX ORDER — BISACODYL 10 MG
10 SUPPOSITORY, RECTAL RECTAL
Status: DISCONTINUED | OUTPATIENT
Start: 2022-05-22 | End: 2022-05-24 | Stop reason: HOSPADM

## 2022-05-22 RX ORDER — POLYETHYLENE GLYCOL 3350 17 G/17G
1 POWDER, FOR SOLUTION ORAL
Status: DISCONTINUED | OUTPATIENT
Start: 2022-05-22 | End: 2022-05-24 | Stop reason: HOSPADM

## 2022-05-22 RX ORDER — AMOXICILLIN 250 MG
2 CAPSULE ORAL 2 TIMES DAILY
Status: DISCONTINUED | OUTPATIENT
Start: 2022-05-22 | End: 2022-05-24 | Stop reason: HOSPADM

## 2022-05-22 RX ORDER — ACETAMINOPHEN 325 MG/1
650 TABLET ORAL EVERY 4 HOURS PRN
Status: DISCONTINUED | OUTPATIENT
Start: 2022-05-22 | End: 2022-05-24 | Stop reason: HOSPADM

## 2022-05-22 RX ADMIN — RIVAROXABAN 10 MG: 10 TABLET, FILM COATED ORAL at 17:11

## 2022-05-22 RX ADMIN — LEVETIRACETAM 1500 MG: 500 TABLET, FILM COATED ORAL at 05:41

## 2022-05-22 RX ADMIN — SODIUM BICARBONATE: 84 INJECTION, SOLUTION INTRAVENOUS at 09:24

## 2022-05-22 RX ADMIN — SODIUM CHLORIDE, POTASSIUM CHLORIDE, SODIUM LACTATE AND CALCIUM CHLORIDE: 600; 310; 30; 20 INJECTION, SOLUTION INTRAVENOUS at 17:12

## 2022-05-22 RX ADMIN — SENNOSIDES AND DOCUSATE SODIUM 2 TABLET: 50; 8.6 TABLET ORAL at 17:11

## 2022-05-22 RX ADMIN — CALCIUM CARBONATE 1000 MG: 500 TABLET, CHEWABLE ORAL at 20:40

## 2022-05-22 RX ADMIN — SODIUM CHLORIDE, POTASSIUM CHLORIDE, SODIUM LACTATE AND CALCIUM CHLORIDE: 600; 310; 30; 20 INJECTION, SOLUTION INTRAVENOUS at 23:53

## 2022-05-22 RX ADMIN — SODIUM CHLORIDE, POTASSIUM CHLORIDE, SODIUM LACTATE AND CALCIUM CHLORIDE: 600; 310; 30; 20 INJECTION, SOLUTION INTRAVENOUS at 11:29

## 2022-05-22 RX ADMIN — SODIUM CHLORIDE, POTASSIUM CHLORIDE, SODIUM LACTATE AND CALCIUM CHLORIDE: 600; 310; 30; 20 INJECTION, SOLUTION INTRAVENOUS at 04:24

## 2022-05-22 RX ADMIN — LEVETIRACETAM 1500 MG: 500 TABLET, FILM COATED ORAL at 17:10

## 2022-05-22 RX ADMIN — SENNOSIDES AND DOCUSATE SODIUM 2 TABLET: 50; 8.6 TABLET ORAL at 05:41

## 2022-05-22 ASSESSMENT — COGNITIVE AND FUNCTIONAL STATUS - GENERAL
SUGGESTED CMS G CODE MODIFIER MOBILITY: CH
DRESSING REGULAR UPPER BODY CLOTHING: A LITTLE
MOBILITY SCORE: 24
HELP NEEDED FOR BATHING: A LITTLE
SUGGESTED CMS G CODE MODIFIER DAILY ACTIVITY: CJ
DAILY ACTIVITIY SCORE: 22

## 2022-05-22 ASSESSMENT — FIBROSIS 4 INDEX
FIB4 SCORE: 0.86
FIB4 SCORE: 0.86

## 2022-05-22 ASSESSMENT — ENCOUNTER SYMPTOMS
CARDIOVASCULAR NEGATIVE: 1
PSYCHIATRIC NEGATIVE: 1
MYALGIAS: 1
NEUROLOGICAL NEGATIVE: 1
EYES NEGATIVE: 1
GASTROINTESTINAL NEGATIVE: 1

## 2022-05-22 ASSESSMENT — PAIN DESCRIPTION - PAIN TYPE
TYPE: ACUTE PAIN

## 2022-05-22 NOTE — PROGRESS NOTES
Hospital Medicine Daily Progress Note    Date of Service  5/22/2022    Chief Complaint  Florentino Ny is a 32 y.o. male admitted 5/18/2022 with seizure    Hospital Course  No notes on file    Interval Problem Update    on tele    No new seizure    Awake, appropriate    On p.o. Keppra    cpk elevated at 22, 000-unchanged    Right arm/shoulder is in sling-he denies pain , while at rest    I have personally seen and examined the patient at bedside. I discussed the plan of care with patient.    Consultants/Specialty  neurology    Code Status  Full Code    Disposition  Patient is not medically cleared for discharge.   Anticipate discharge to to home with close outpatient follow-up.  I have placed the appropriate orders for post-discharge needs.    Review of Systems  Review of Systems   Constitutional: Positive for malaise/fatigue.   HENT: Negative.    Eyes: Negative.    Cardiovascular: Negative.    Gastrointestinal: Negative.    Genitourinary: Negative.    Musculoskeletal: Positive for myalgias.   Skin: Negative.    Neurological: Negative.    Psychiatric/Behavioral: Negative.         Physical Exam  Temp:  [36.2 °C (97.2 °F)-37.2 °C (99 °F)] 36.8 °C (98.2 °F)  Pulse:  [72-91] 72  Resp:  [17-18] 17  BP: (133-145)/(82-98) 140/98  SpO2:  [95 %-96 %] 96 %    Physical Exam  Constitutional:       General: He is not in acute distress.     Appearance: He is obese. He is not ill-appearing or toxic-appearing.   HENT:      Head: Normocephalic and atraumatic.      Mouth/Throat:      Mouth: Mucous membranes are moist.   Eyes:      General:         Right eye: No discharge.         Left eye: No discharge.      Extraocular Movements: Extraocular movements intact.      Pupils: Pupils are equal, round, and reactive to light.   Cardiovascular:      Rate and Rhythm: Normal rate.      Heart sounds:     No gallop.   Pulmonary:      Effort: Pulmonary effort is normal. No respiratory distress.      Breath sounds: No stridor. No wheezing,  rhonchi or rales.   Abdominal:      General: Bowel sounds are normal. There is no distension.      Palpations: There is no mass.      Tenderness: There is no guarding.      Hernia: No hernia is present.   Musculoskeletal:         General: Tenderness (mild diffuse) present.      Cervical back: Normal range of motion. No rigidity.   Skin:     Capillary Refill: Capillary refill takes 2 to 3 seconds.      Coloration: Skin is not jaundiced or pale.      Findings: No bruising or erythema.   Neurological:      General: No focal deficit present.      Cranial Nerves: No cranial nerve deficit.      Motor: No weakness.      Gait: Gait normal.         Fluids    Intake/Output Summary (Last 24 hours) at 5/22/2022 0914  Last data filed at 5/21/2022 1800  Gross per 24 hour   Intake 750 ml   Output --   Net 750 ml       Laboratory  Recent Labs     05/20/22  0337   WBC 9.1   RBC 3.79*   HEMOGLOBIN 11.5*   HEMATOCRIT 33.1*   MCV 87.3   MCH 30.3   MCHC 34.7   RDW 38.0   PLATELETCT 176   MPV 10.1     Recent Labs     05/20/22  0337 05/21/22  0340 05/22/22  0319   SODIUM 139 139 135   POTASSIUM 3.4* 3.9 3.6   CHLORIDE 108 105 101   CO2 21 23 22   GLUCOSE 96 103* 89   BUN 10 9 6*   CREATININE 1.61* 1.17 0.94   CALCIUM 7.5* 8.8 8.9                   Imaging  EC-ECHOCARDIOGRAM COMPLETE W/O CONT   Final Result      DX-CHEST-PORTABLE (1 VIEW)   Final Result         1.  Left basilar atelectasis or early infiltrates.   2.  Trace left pleural effusion      DX-ABDOMEN FOR TUBE PLACEMENT   Final Result         1.  Nonspecific bowel gas pattern.   2.  Nasogastric tube tip terminates overlying the expected location of the gastric antrum.   3.  Left basilar atelectasis or early infiltrate.      [id number] VIEW   Final Result      NG tube turns at the GE junction with tip in the mid to distal esophagus.                  DX-CHEST-PORTABLE (1 VIEW)   Final Result         1.  Endotracheal tube is noted in place with tip at the lower end of the  clavicles.      2.  NG tube turns at the level of the GE junction with the tip located in the mid to distal esophagus.      3.  No new infiltrates or consolidations.      DX-SHOULDER 2+ RIGHT   Final Result      1.  Closed reduction of previously seen posterior shoulder dislocation.   2.  Comminuted and displaced humeral head fractures.   3.  Small presumed fracture of the superior glenoid.      CT-SHOULDER WITH PLUS RECONS RIGHT   Final Result      1.  Posterior shoulder dislocation with associated comminuted reverse Hill-Sachs lesion. Multiple fracture fragments within the joint space.   2.  Likely tiny fracture fragment of the superior glenoid rim.   3.  Associated glenohumeral joint effusion with tiny focus of gas of uncertain source.      CT-ABDOMEN-PELVIS WITH   Final Result      No acute inflammation in the abdomen or pelvis.      DX-CHEST-PORTABLE (1 VIEW)   Final Result      No acute cardiac or pulmonary abnormalities are identified.      DX-SHOULDER 2+ RIGHT   Final Result      Calcification in the region of the lower glenohumeral joint recess could be a fracture fragment or could be related to synovial osteochondromatosis. Other inflammatory conditions such as pigmented villonodular synovitis might also cause this appearance.    Further assessment could be performed with CT or MRI as determined clinically.      CT-HEAD W/O   Final Result         NO ACUTE ABNORMALITIES ARE NOTED ON CT SCAN OF THE HEAD.                    Assessment/Plan  * Status epilepticus (HCC)- (present on admission)  Assessment & Plan  History of seizures since 2009  He had not been taking the 1,500 mg BID as prescribed and had been taking 1,500 mg daily  IV Keppra and Vimpat to po on 5/21  Neurology on case  Drivers license will need to be revoked and DMV paperwork was filled out  EEG was done    MINNA (acute kidney injury) (HCC)- (present on admission)  Assessment & Plan  In the setting of rhabdomyolysis    Follow urine output  BMP  ordered for the morning    Rhabdomyolysis- (present on admission)  Assessment & Plan  CPK remains over 22,000  IV lactated ringers + bicarb drip  Follow urine output  Check CPK in the morning  Follow renal function panel in the morning    Acute respiratory failure with hypoxia (HCC)- (present on admission)  Assessment & Plan  Requiring intubation--> resolved    Lactic acidosis- (present on admission)  Assessment & Plan  Secondary to seizure    Dislocation of right shoulder joint- (present on admission)  Assessment & Plan  Sling and limited ROM 3 weeks  Dr. Goel ortho will f/u outpatient.    Cardiac arrest (HCC)- (present on admission)  Assessment & Plan  In the setting of post-ictal condition and IV benzodiazepines   He has remained in a sinus rhythm  Echocardiogram is unremarkable with a possible bicuspid valve    Epilepsy (HCC)- (present on admission)  Assessment & Plan  Last seizure had been 2012       VTE prophylaxis: SCDs/TEDs    I have performed a physical exam and reviewed and updated ROS and Plan today (5/22/2022). In review of yesterday's note (5/21/2022), there are no changes except as documented above.

## 2022-05-22 NOTE — CARE PLAN
The patient is Stable - Low risk of patient condition declining or worsening    Shift Goals  Clinical Goals: Monitor CPK, mobilize, maintain hemodynamic stability  Patient Goals: Rest, decrease CPK, go home  Family Goals: EUNICE, none present at this time    Progress made toward(s) clinical / shift goals:    Problem: Pain - Standard  Goal: Alleviation of pain or a reduction in pain to the patient’s comfort goal  Outcome: Progressing     Problem: Knowledge Deficit - Standard  Goal: Patient and family/care givers will demonstrate understanding of plan of care, disease process/condition, diagnostic tests and medications  Outcome: Progressing     Problem: Safety - Medical Restraint  Goal: Free from restraint(s) (Restraint for Interference with Medical Device)  Outcome: Met  Pt not in restraints this shift. Participatory in medical care.     Patient is not progressing towards the following goals:  Patient's CPK remains elevated, unable to DC at this time.

## 2022-05-22 NOTE — CARE PLAN
The patient is Stable - Low risk of patient condition declining or worsening    Shift Goals  Clinical Goals: Monitor CPK; IVF  Patient Goals: Go home  Family Goals: EUNICE    Progress made toward(s) clinical / shift goals:    Problem: Pain - Standard  Goal: Alleviation of pain or a reduction in pain to the patient’s comfort goal  Outcome: Progressing  Flowsheets (Taken 5/22/2022 8375)  Pain Rating Scale (NPRS): 1  Note: Patient states shoulder pain is getting better     Problem: Knowledge Deficit - Standard  Goal: Patient and family/care givers will demonstrate understanding of plan of care, disease process/condition, diagnostic tests and medications  Outcome: Progressing  Note: Discussed plan of care with patient       Patient is not progressing towards the following goals:

## 2022-05-23 LAB
ANION GAP SERPL CALC-SCNC: 11 MMOL/L (ref 7–16)
BACTERIA BLD CULT: NORMAL
BACTERIA BLD CULT: NORMAL
BUN SERPL-MCNC: 9 MG/DL (ref 8–22)
CALCIUM SERPL-MCNC: 9.1 MG/DL (ref 8.5–10.5)
CHLORIDE SERPL-SCNC: 103 MMOL/L (ref 96–112)
CK SERPL-CCNC: ABNORMAL U/L (ref 0–154)
CO2 SERPL-SCNC: 24 MMOL/L (ref 20–33)
CREAT SERPL-MCNC: 0.89 MG/DL (ref 0.5–1.4)
GFR SERPLBLD CREATININE-BSD FMLA CKD-EPI: 117 ML/MIN/1.73 M 2
GLUCOSE SERPL-MCNC: 91 MG/DL (ref 65–99)
POTASSIUM SERPL-SCNC: 3.5 MMOL/L (ref 3.6–5.5)
SIGNIFICANT IND 70042: NORMAL
SIGNIFICANT IND 70042: NORMAL
SITE SITE: NORMAL
SITE SITE: NORMAL
SODIUM SERPL-SCNC: 138 MMOL/L (ref 135–145)
SOURCE SOURCE: NORMAL
SOURCE SOURCE: NORMAL

## 2022-05-23 PROCEDURE — 82550 ASSAY OF CK (CPK): CPT

## 2022-05-23 PROCEDURE — 80048 BASIC METABOLIC PNL TOTAL CA: CPT

## 2022-05-23 PROCEDURE — A9270 NON-COVERED ITEM OR SERVICE: HCPCS

## 2022-05-23 PROCEDURE — 770020 HCHG ROOM/CARE - TELE (206)

## 2022-05-23 PROCEDURE — 700102 HCHG RX REV CODE 250 W/ 637 OVERRIDE(OP): Performed by: HOSPITALIST

## 2022-05-23 PROCEDURE — A9270 NON-COVERED ITEM OR SERVICE: HCPCS | Performed by: HOSPITALIST

## 2022-05-23 PROCEDURE — 700102 HCHG RX REV CODE 250 W/ 637 OVERRIDE(OP)

## 2022-05-23 PROCEDURE — 99233 SBSQ HOSP IP/OBS HIGH 50: CPT | Performed by: HOSPITALIST

## 2022-05-23 PROCEDURE — 700111 HCHG RX REV CODE 636 W/ 250 OVERRIDE (IP): Performed by: HOSPITALIST

## 2022-05-23 PROCEDURE — 36415 COLL VENOUS BLD VENIPUNCTURE: CPT

## 2022-05-23 PROCEDURE — 700105 HCHG RX REV CODE 258: Performed by: HOSPITALIST

## 2022-05-23 RX ADMIN — CALCIUM CARBONATE 1000 MG: 500 TABLET, CHEWABLE ORAL at 20:42

## 2022-05-23 RX ADMIN — SODIUM CHLORIDE, POTASSIUM CHLORIDE, SODIUM LACTATE AND CALCIUM CHLORIDE: 600; 310; 30; 20 INJECTION, SOLUTION INTRAVENOUS at 06:14

## 2022-05-23 RX ADMIN — LEVETIRACETAM 1500 MG: 500 TABLET, FILM COATED ORAL at 06:13

## 2022-05-23 RX ADMIN — SODIUM CHLORIDE, POTASSIUM CHLORIDE, SODIUM LACTATE AND CALCIUM CHLORIDE: 600; 310; 30; 20 INJECTION, SOLUTION INTRAVENOUS at 20:20

## 2022-05-23 RX ADMIN — SENNOSIDES AND DOCUSATE SODIUM 2 TABLET: 50; 8.6 TABLET ORAL at 06:13

## 2022-05-23 RX ADMIN — RIVAROXABAN 10 MG: 10 TABLET, FILM COATED ORAL at 16:46

## 2022-05-23 RX ADMIN — LEVETIRACETAM 1500 MG: 500 TABLET, FILM COATED ORAL at 16:46

## 2022-05-23 RX ADMIN — SODIUM BICARBONATE: 84 INJECTION, SOLUTION INTRAVENOUS at 09:09

## 2022-05-23 ASSESSMENT — ENCOUNTER SYMPTOMS
GASTROINTESTINAL NEGATIVE: 1
PSYCHIATRIC NEGATIVE: 1
MYALGIAS: 1
CARDIOVASCULAR NEGATIVE: 1
EYES NEGATIVE: 1
NEUROLOGICAL NEGATIVE: 1

## 2022-05-23 ASSESSMENT — PAIN DESCRIPTION - PAIN TYPE
TYPE: ACUTE PAIN

## 2022-05-23 NOTE — DISCHARGE PLANNING
Case Management Discharge Planning    Admission Date: 5/18/2022  GMLOS: 4.3  ALOS: 5    6-Clicks ADL Score: 22  6-Clicks Mobility Score: 24      Anticipated Discharge Dispo: Discharge Disposition: Discharged to home/self care (01)    DME Needed: No    Action(s) Taken: OTHER - chart review    Escalations Completed: None    Medically Clear: No    Next Steps: f/u with pt, medical and nursing teams regarding treatment plan, discharge planning needs and barriers.     Barriers to Discharge: Medical clearance    Is the patient up for discharge: Pending

## 2022-05-23 NOTE — PROGRESS NOTES
Received Bedside report. Assumed care at 1900. This pt is AOx4, ambulatory with no assistance required, voiding adequately, 0/10 pain. Patient and RN discussed plan of care: questions answered. Labs noted, assessment complete, patient tolerating regular diet. Tele box in place. Pt is on room air. Call light in place, fall precautions in place, patient educated on importance of calling for assistance. No additional needs at this time. VSS

## 2022-05-23 NOTE — PROGRESS NOTES
Lab called with critical result of CPK greater than 22,000 at 0254. Critical lab result read back.   VAHE Smith notified of critical lab result at 0301.  Critical lab result read back by VAHE Smith.

## 2022-05-23 NOTE — PROGRESS NOTES
"Hospital Medicine Daily Progress Note    Date of Service  5/23/2022    Chief Complaint  Florentino Ny is a 32 y.o. male admitted 5/18/2022 with seizure    Hospital Course  Florentino Ny is a 32 y.o. male who presented 5/18/2022 with known hx of seizures on Keppra 1500 mg bid with no breakthrough seizures since 2013.  Pt woke up with right shoulder pain and was his only complaint before his partner came home today and found him slumped in the bathroom. EMS called and brought to the ER and had another tonic clonic seizure 1 min.   In the Er while he was being assessed for his shoulder he had a tonic clonic seizure and given 2 mg of ativan and became cyanotic and had PEA cardiac arrest requiring one round of epinephrine and 2 mins CPR with ROSC. Intubated,     Initial labs were HCO3 of 8 and wbc of 35 and lactic of 7.5     Due to elevated lactic acid CT abdoman pelvis done negative for abnormalities  CT head negative for bleed or abnormalities  CT of right shoulder showed dislocation with comminuted revere Hill Sachs lesion with multiple fracture fragments in the joint space. Also \"Associated glenohumeral joint effusion with tiny focus of gas of uncertain source.\"     Dr. Goel from Orthopedics reduced the right shoulder     Dr. Garcia from neurology also consulted and recommended EEG and also work up sepsis and also added vempat     Pt does not do recreational drugs and compliant with his meds. Had denied sleep deprivation, no falls and no fevers or headaches.    Patient was monitored in the ICU for 48 hours and once it is noted that patient had no longer had any seizures patient was transferred to telemetry.  Patient had if evidence of rhabdomyolysis that was treated with IV fluids.  His right arm was kept in his sling status post reduction by the orthopedic doctor and patient to follow-up with orthopedics in outpatient setting once discharged.  Patient remained in hospital for several days due to " severe rhabdomyolysis.  There is no evidence of compartment syndrome.  Patient's Keppra and Vimpat were de-escalated to Keppra by itself I was noted that patient was not taking Keppra as previously prescribed.      Interval Problem Update    on tele    No new seizure    Awake, appropriate    On p.o. Keppra    cpk elevated greater than 1 22, 000-unchanged    Right arm/shoulder is in sling-he denies pain , while at rest    Patient encouraged to ambulate    I have personally seen and examined the patient at bedside. I discussed the plan of care with patient.    Consultants/Specialty  neurology    Code Status  Full Code    Disposition  Patient is not medically cleared for discharge.   Anticipate discharge to to home with close outpatient follow-up.  I have placed the appropriate orders for post-discharge needs.    Review of Systems  Review of Systems   Constitutional: Positive for malaise/fatigue.   HENT: Negative.    Eyes: Negative.    Cardiovascular: Negative.    Gastrointestinal: Negative.    Genitourinary: Negative.    Musculoskeletal: Positive for myalgias.   Skin: Negative.    Neurological: Negative.    Psychiatric/Behavioral: Negative.         Physical Exam  Temp:  [36.1 °C (96.9 °F)-36.9 °C (98.4 °F)] 36.1 °C (96.9 °F)  Pulse:  [64-86] 86  Resp:  [16-20] 18  BP: (130-142)/(78-99) 139/91  SpO2:  [93 %-97 %] 97 %    Physical Exam  Constitutional:       General: He is not in acute distress.     Appearance: He is obese. He is not ill-appearing or toxic-appearing.   HENT:      Head: Normocephalic and atraumatic.      Mouth/Throat:      Mouth: Mucous membranes are moist.   Eyes:      General:         Right eye: No discharge.         Left eye: No discharge.      Extraocular Movements: Extraocular movements intact.      Pupils: Pupils are equal, round, and reactive to light.   Cardiovascular:      Rate and Rhythm: Normal rate.      Heart sounds:     No gallop.   Pulmonary:      Effort: Pulmonary effort is normal. No  respiratory distress.      Breath sounds: No stridor. No wheezing, rhonchi or rales.   Abdominal:      General: Bowel sounds are normal. There is no distension.      Palpations: There is no mass.      Tenderness: There is no guarding.      Hernia: No hernia is present.   Musculoskeletal:         General: No tenderness.      Cervical back: Normal range of motion. No rigidity.   Skin:     Capillary Refill: Capillary refill takes 2 to 3 seconds.      Coloration: Skin is not jaundiced or pale.      Findings: No bruising or erythema.   Neurological:      General: No focal deficit present.      Cranial Nerves: No cranial nerve deficit.      Motor: No weakness.      Gait: Gait normal.         Fluids    Intake/Output Summary (Last 24 hours) at 5/23/2022 1339  Last data filed at 5/23/2022 0900  Gross per 24 hour   Intake 860 ml   Output --   Net 860 ml       Laboratory      Recent Labs     05/21/22  0340 05/22/22  0319 05/23/22  0116   SODIUM 139 135 138   POTASSIUM 3.9 3.6 3.5*   CHLORIDE 105 101 103   CO2 23 22 24   GLUCOSE 103* 89 91   BUN 9 6* 9   CREATININE 1.17 0.94 0.89   CALCIUM 8.8 8.9 9.1                   Imaging  EC-ECHOCARDIOGRAM COMPLETE W/O CONT   Final Result      DX-CHEST-PORTABLE (1 VIEW)   Final Result         1.  Left basilar atelectasis or early infiltrates.   2.  Trace left pleural effusion      DX-ABDOMEN FOR TUBE PLACEMENT   Final Result         1.  Nonspecific bowel gas pattern.   2.  Nasogastric tube tip terminates overlying the expected location of the gastric antrum.   3.  Left basilar atelectasis or early infiltrate.      [id number] VIEW   Final Result      NG tube turns at the GE junction with tip in the mid to distal esophagus.                  DX-CHEST-PORTABLE (1 VIEW)   Final Result         1.  Endotracheal tube is noted in place with tip at the lower end of the clavicles.      2.  NG tube turns at the level of the GE junction with the tip located in the mid to distal esophagus.      3.   No new infiltrates or consolidations.      DX-SHOULDER 2+ RIGHT   Final Result      1.  Closed reduction of previously seen posterior shoulder dislocation.   2.  Comminuted and displaced humeral head fractures.   3.  Small presumed fracture of the superior glenoid.      CT-SHOULDER WITH PLUS RECONS RIGHT   Final Result      1.  Posterior shoulder dislocation with associated comminuted reverse Hill-Sachs lesion. Multiple fracture fragments within the joint space.   2.  Likely tiny fracture fragment of the superior glenoid rim.   3.  Associated glenohumeral joint effusion with tiny focus of gas of uncertain source.      CT-ABDOMEN-PELVIS WITH   Final Result      No acute inflammation in the abdomen or pelvis.      DX-CHEST-PORTABLE (1 VIEW)   Final Result      No acute cardiac or pulmonary abnormalities are identified.      DX-SHOULDER 2+ RIGHT   Final Result      Calcification in the region of the lower glenohumeral joint recess could be a fracture fragment or could be related to synovial osteochondromatosis. Other inflammatory conditions such as pigmented villonodular synovitis might also cause this appearance.    Further assessment could be performed with CT or MRI as determined clinically.      CT-HEAD W/O   Final Result         NO ACUTE ABNORMALITIES ARE NOTED ON CT SCAN OF THE HEAD.                    Assessment/Plan  * Status epilepticus (HCC)- (present on admission)  Assessment & Plan  History of seizures since 2009  He had not been taking the 1,500 mg BID as prescribed and had been taking 1,500 mg daily  Continue p.o. Keppra          Neurology on case  Drivers license will need to be revoked and [facility] paperwork was filled out  EEG is complete    MINNA (acute kidney injury) (HCC)- (present on admission)  Assessment & Plan  In the setting of rhabdomyolysis    Follow urine output  BMP ordered for the morning    Rhabdomyolysis- (present on admission)  Assessment & Plan  CPK remains over 22,000  IV lactated ringers +  bicarb drip  Follow urine output  Check CPK in the morning  Follow renal function panel in the morning    Acute respiratory failure with hypoxia (HCC)- (present on admission)  Assessment & Plan  Requiring intubation--> resolved    Lactic acidosis- (present on admission)  Assessment & Plan  Secondary to seizure    Dislocation of right shoulder joint- (present on admission)  Assessment & Plan  Sling and limited ROM 3 weeks  Dr. Goel ortho will f/u outpatient.    Cardiac arrest (HCC)- (present on admission)  Assessment & Plan  In the setting of post-ictal condition and IV benzodiazepines   He has remained in a sinus rhythm  Echocardiogram is unremarkable with a possible bicuspid valve    Epilepsy (HCC)- (present on admission)  Assessment & Plan  Last seizure had been 2012       VTE prophylaxis: SCDs/TEDs    I have performed a physical exam and reviewed and updated ROS and Plan today (5/23/2022). In review of yesterday's note (5/22/2022), there are no changes except as documented above.

## 2022-05-23 NOTE — CARE PLAN
Problem: Knowledge Deficit - Standard  Goal: Patient and family/care givers will demonstrate understanding of plan of care, disease process/condition, diagnostic tests and medications  Outcome: Progressing  Note: Patient verbally demonstrated full understanding of plan of care.  Will continue to educate patient and reassess patient's understanding.       Problem: Communication  Goal: The ability to communicate needs accurately and effectively will improve  Outcome: Progressing  Flowsheets (Taken 5/23/2022 0140)  Communication:   Assessed patient's ability to understand and communicate   Oriented patient to call light  Note: Patient has communicated needs effectively throughout night and has used call light appropriately.   The patient is Stable - Low risk of patient condition declining or worsening    Shift Goals  Clinical Goals: Monitor labs, especially CPK, ambulate, rest/comfort  Patient Goals: Rest, decrease CPK  Family Goals: EUNICE. No family present    Progress made toward(s) clinical / shift goals:  Patient verbally demonstrated full understanding of plan of care.  Will continue to educate patient and reassess patient's understanding.  Patient has communicated needs effectively throughout night and has used call light appropriately.    Patient is not progressing towards the following goals: N/A

## 2022-05-23 NOTE — HOSPITAL COURSE
"Florentino Ny is a 32 y.o. male who presented 5/18/2022 with known hx of seizures on Keppra 1500 mg bid with no breakthrough seizures since 2013.  Pt woke up with right shoulder pain and was his only complaint before his partner came home today and found him slumped in the bathroom. EMS called and brought to the ER and had another tonic clonic seizure 1 min.   In the Er while he was being assessed for his shoulder he had a tonic clonic seizure and given 2 mg of ativan and became cyanotic and had PEA cardiac arrest requiring one round of epinephrine and 2 mins CPR with ROSC. Intubated,     Initial labs were HCO3 of 8 and wbc of 35 and lactic of 7.5     Due to elevated lactic acid CT abdoman pelvis done negative for abnormalities  CT head negative for bleed or abnormalities  CT of right shoulder showed dislocation with comminuted revere Hill Sachs lesion with multiple fracture fragments in the joint space. Also \"Associated glenohumeral joint effusion with tiny focus of gas of uncertain source.\"     Dr. Goel from Orthopedics reduced the right shoulder     Dr. Garcia from neurology also consulted and recommended EEG and also work up sepsis and also added vempat     Pt does not do recreational drugs and compliant with his meds. Had denied sleep deprivation, no falls and no fevers or headaches.    Patient was monitored in the ICU for 48 hours and once it is noted that patient had no longer had any seizures patient was transferred to telemetry.  Patient had if evidence of rhabdomyolysis that was treated with IV fluids.  His right arm was kept in his sling status post reduction by the orthopedic doctor and patient to follow-up with orthopedics in outpatient setting once discharged.  Patient remained in hospital for several days due to severe rhabdomyolysis.  There is no evidence of compartment syndrome.  Patient's Keppra and Vimpat were de-escalated to Keppra by itself I was noted that patient was not taking " Keppra as previously prescribed.

## 2022-05-24 VITALS
TEMPERATURE: 98.7 F | OXYGEN SATURATION: 96 % | HEIGHT: 74 IN | SYSTOLIC BLOOD PRESSURE: 130 MMHG | HEART RATE: 80 BPM | BODY MASS INDEX: 31.49 KG/M2 | RESPIRATION RATE: 16 BRPM | WEIGHT: 245.37 LBS | DIASTOLIC BLOOD PRESSURE: 84 MMHG

## 2022-05-24 PROBLEM — I46.9 CARDIAC ARREST (HCC): Status: RESOLVED | Noted: 2022-05-18 | Resolved: 2022-05-24

## 2022-05-24 PROBLEM — J96.01 ACUTE RESPIRATORY FAILURE WITH HYPOXIA (HCC): Status: RESOLVED | Noted: 2022-05-19 | Resolved: 2022-05-24

## 2022-05-24 PROBLEM — G40.901 STATUS EPILEPTICUS (HCC): Status: RESOLVED | Noted: 2022-05-18 | Resolved: 2022-05-24

## 2022-05-24 LAB
ANION GAP SERPL CALC-SCNC: 11 MMOL/L (ref 7–16)
BUN SERPL-MCNC: 8 MG/DL (ref 8–22)
CALCIUM SERPL-MCNC: 9 MG/DL (ref 8.5–10.5)
CHLORIDE SERPL-SCNC: 103 MMOL/L (ref 96–112)
CK SERPL-CCNC: 6375 U/L (ref 0–154)
CK SERPL-CCNC: 7897 U/L (ref 0–154)
CO2 SERPL-SCNC: 25 MMOL/L (ref 20–33)
CREAT SERPL-MCNC: 0.82 MG/DL (ref 0.5–1.4)
GFR SERPLBLD CREATININE-BSD FMLA CKD-EPI: 120 ML/MIN/1.73 M 2
GLUCOSE SERPL-MCNC: 97 MG/DL (ref 65–99)
POTASSIUM SERPL-SCNC: 3.8 MMOL/L (ref 3.6–5.5)
SODIUM SERPL-SCNC: 139 MMOL/L (ref 135–145)

## 2022-05-24 PROCEDURE — 99239 HOSP IP/OBS DSCHRG MGMT >30: CPT | Performed by: STUDENT IN AN ORGANIZED HEALTH CARE EDUCATION/TRAINING PROGRAM

## 2022-05-24 PROCEDURE — 700102 HCHG RX REV CODE 250 W/ 637 OVERRIDE(OP): Performed by: HOSPITALIST

## 2022-05-24 PROCEDURE — 82550 ASSAY OF CK (CPK): CPT

## 2022-05-24 PROCEDURE — 700105 HCHG RX REV CODE 258: Performed by: HOSPITALIST

## 2022-05-24 PROCEDURE — 80048 BASIC METABOLIC PNL TOTAL CA: CPT

## 2022-05-24 PROCEDURE — 36415 COLL VENOUS BLD VENIPUNCTURE: CPT

## 2022-05-24 PROCEDURE — A9270 NON-COVERED ITEM OR SERVICE: HCPCS | Performed by: HOSPITALIST

## 2022-05-24 RX ORDER — ACETAMINOPHEN 325 MG/1
650 TABLET ORAL EVERY 4 HOURS PRN
Qty: 30 TABLET | Refills: 0 | Status: SHIPPED | OUTPATIENT
Start: 2022-05-24

## 2022-05-24 RX ADMIN — SODIUM CHLORIDE, POTASSIUM CHLORIDE, SODIUM LACTATE AND CALCIUM CHLORIDE: 600; 310; 30; 20 INJECTION, SOLUTION INTRAVENOUS at 03:16

## 2022-05-24 RX ADMIN — LEVETIRACETAM 1500 MG: 500 TABLET, FILM COATED ORAL at 06:39

## 2022-05-24 ASSESSMENT — PAIN DESCRIPTION - PAIN TYPE: TYPE: ACUTE PAIN

## 2022-05-24 NOTE — CARE PLAN
Problem: Knowledge Deficit - Standard  Goal: Patient and family/care givers will demonstrate understanding of plan of care, disease process/condition, diagnostic tests and medications  Outcome: Progressing     Problem: Communication  Goal: The ability to communicate needs accurately and effectively will improve  Outcome: Progressing   The patient is Watcher - Medium risk of patient condition declining or worsening    Shift Goals  Clinical Goals: CPK under 22,000, stable vitals, rest/comfort  Patient Goals: Minimal disturbances throughout night, rest/comfort, labs draw at 0600  Family Goals: Decrease in patient's CPK, rest/comfort for patient    Progress made toward(s) clinical / shift goals:  Patient effectively communicated his needs throughout the night.  Disturbances were minimized from midnight to 0600 per patient request to promote sleep.  Patient ambulated frequently.      Patient is not progressing towards the following goals: N/A

## 2022-05-24 NOTE — PROGRESS NOTES
Received bedside report from GREGORIA bueno, pt care assumed. VS stable, pt assessment complete. Pt A&Ox4, chronic c/o 4/10  pain at this time. POC discussed with pt and verbalizes no questions. Pt denies any additional needs at this time. Bed locked and in lowest position, bed alarm off. Pt educated on fall risk and verbalized understanding, call light within reach, hourly rounding initiated. In a sinus rhythm.

## 2022-05-24 NOTE — PROGRESS NOTES
Monitor Summary  SR 75-84  (R) PVC  .12/.07/.42

## 2022-05-24 NOTE — DISCHARGE INSTRUCTIONS
Take medication as directed, Keppra 1500 mg BID, information to follow up with [facility] neurology has been provided. Call to schedule   Repeat labs should be done in ~ 1 week, you can get that at any [facility] lab, you need to stay hydrated, recommend drinking 7-8 (16 oz) bottles of water daily as able   Follow up with your primary care doc in 1-2 weeks   Information for orthopedic surgeon has been given , follow up in 4 weeks or sooner if needed.   2-3 weeks of immobilization followed by increasing movement and strength exercises.   You are not allowed to drive a care or operate heavy machinery until you are cleared to do so by a physician.         Discharge Instructions    Discharged to home by car with relative. Discharged via wheelchair, hospital escort: Yes.  Special equipment needed: Not Applicable    Be sure to schedule a follow-up appointment with your primary care doctor or any specialists as instructed.     Discharge Plan:        I understand that a diet low in cholesterol, fat, and sodium is recommended for good health. Unless I have been given specific instructions below for another diet, I accept this instruction as my diet prescription.     Special Instructions: None    Is patient discharged on Warfarin / Coumadin?   No     Depression / Suicide Risk    As you are discharged from this [facility], it is important to learn how to keep safe from harming yourself.    Recognize the warning signs:  Abrupt changes in personality, positive or negative- including increase in energy   Giving away possessions  Change in eating patterns- significant weight changes-  positive or negative  Change in sleeping patterns- unable to sleep or sleeping all the time   Unwillingness or inability to communicate  Depression  Unusual sadness, discouragement and loneliness  Talk of wanting to die  Neglect of personal appearance   Rebelliousness- reckless behavior  Withdrawal from people/activities they love  Confusion-  inability to concentrate     If you or a loved one observes any of these behaviors or has concerns about self-harm, here's what you can do:  Talk about it- your feelings and reasons for harming yourself  Remove any means that you might use to hurt yourself (examples: pills, rope, extension cords, firearm)  Get professional help from the community (Mental Health, Substance Abuse, psychological counseling)  Do not be alone:Call your Safe Contact- someone whom you trust who will be there for you.  Call your local CRISIS HOTLINE 263-7645 or [phone]  Call your local Children's Mobile Crisis Response Team [place] (876) 577-5698 or www.[employer]  Call the toll free National Suicide Prevention Hotlines   National Suicide Prevention Lifeline 722-874-UUFT (4394)  [place] [employer] Line Network 800-SUICIDE (420-1108)              Rhabdomyolysis  Rhabdomyolysis is a condition that happens when muscle cells break down and release substances into the blood that can damage the kidneys. This condition happens because of damage to the muscles that move bones (skeletal muscle). When the skeletal muscles are damaged, substances inside the muscle cells go into the blood. One of these substances is a protein called myoglobin.  Large amounts of myoglobin can cause kidney damage or kidney failure. Other substances that are released by muscle cells may upset the balance of the minerals (electrolytes) in your blood. This imbalance causes your blood to have too much acid (acidosis).  What are the causes?  This condition is caused by muscle damage. Muscle damage often happens because of:  Using your muscles too much.  An injury that crushes or squeezes a muscle too tightly.  Using illegal drugs, mainly cocaine.  Alcohol abuse.  Other possible causes include:  Prescription medicines, such as those that:  Lower cholesterol (statins).  Treat ADHD (attention deficit hyperactivity disorder) or help with weight loss  (amphetamines).  Treat pain (opiates).  Infections.  Muscle diseases that are passed down from parent to child (inherited).  High fever.  Heatstroke.  Not having enough fluids in your body (dehydration).  Seizures.  Surgery.  What increases the risk?  This condition is more likely to develop in people who:  Have a family history of muscle disease.  Take part in extreme sports, such as running in marathons.  Have diabetes.  Are older.  Abuse drugs or alcohol.  What are the signs or symptoms?  Symptoms of this condition vary. Some people have very few symptoms, and other people have many symptoms. The most common symptoms include:  Muscle pain and swelling.  Weak muscles.  Dark urine.  Feeling weak and tired.  Other symptoms include:  Nausea and vomiting.  Fever.  Pain in the abdomen.  Pain in the joints.  Symptoms of complications from this condition include:  Heart rhythm that is not normal (arrhythmia).  Seizures.  Not urinating enough because of kidney failure.  Very low blood pressure (shock). Signs of shock include dizziness, blurry vision, and clammy skin.  Bleeding that is hard to stop or control.  How is this diagnosed?  This condition is diagnosed based on your medical history, your symptoms, and a physical exam. Tests may also be done, including:  Blood tests.  Urine tests to check for myoglobin.  You may also have other tests to check for causes of muscle damage and to check for complications.  How is this treated?  Treatment for this condition helps to:  Make sure you have enough fluids in your body.  Lower the acid levels in your blood to reverse acidosis.  Protect your kidneys.  Treatment may include:  Fluids and medicines given through an IV tube that is inserted into one of your veins.  Medicines to lower acidosis or to bring back the balance of the minerals in your body.  Hemodialysis. This treatment uses an artificial kidney machine to filter your blood while you recover. You may have this if other  treatments are not helping.  Follow these instructions at home:    Take over-the-counter and prescription medicines only as told by your health care provider.  Rest at home until your health care provider says that you can return to your normal activities.  Drink enough fluid to keep your urine clear or pale yellow.  Do not do activities that take a lot of effort (are strenuous). Ask your health care provider what level of exercise is safe for you.  Do not abuse drugs or alcohol. If you are having problems with drug or alcohol use, ask your health care provider for help.  Keep all follow-up visits as told by your health care provider. This is important.  Contact a health care provider if:  You start having symptoms of this condition after treatment.  Get help right away if:  You have a seizure.  You bleed easily or cannot control bleeding.  You cannot urinate.  You have chest pain.  You have trouble breathing.  This information is not intended to replace advice given to you by your health care provider. Make sure you discuss any questions you have with your health care provider.  Document Released: 11/30/2005 Document Revised: 11/30/2018 Document Reviewed: 09/29/2017  [employer] Patient Education © 2020 [employer]      Acute Kidney Injury, Adult    Acute kidney injury is a sudden worsening of kidney function. The kidneys are organs that have several jobs. They filter the blood to remove waste products and extra fluid. They also maintain a healthy balance of minerals and hormones in the body, which helps control blood pressure and keep bones strong. With this condition, your kidneys do not do their jobs as well as they should.  This condition ranges from mild to severe. Over time it may develop into long-lasting (chronic) kidney disease. Early detection and treatment may prevent acute kidney injury from developing into a chronic condition.  What are the causes?  Common causes of this condition include:  A problem  with blood flow to the kidneys. This may be caused by:  Low blood pressure (hypotension) or shock.  Blood loss.  Heart and blood vessel (cardiovascular) disease.  Severe burns.  Liver disease.  Direct damage to the kidneys. This may be caused by:  Certain medicines.  A kidney infection.  Poisoning.  Being around or in contact with toxic substances.  A surgical wound.  A hard, direct hit to the kidney area.  A sudden blockage of urine flow. This may be caused by:  Cancer.  Kidney stones.  An enlarged prostate in males.  What are the signs or symptoms?  Symptoms of this condition may not be obvious until the condition becomes severe. Symptoms of this condition can include:  Tiredness (lethargy), or difficulty staying awake.  Nausea or vomiting.  Swelling (edema) of the face, legs, ankles, or feet.  Problems with urination, such as:  Abdominal pain, or pain along the side of your stomach (flank).  Decreased urine production.  Decrease in the force of urine flow.  Muscle twitches and cramps, especially in the legs.  Confusion or trouble concentrating.  Loss of appetite.  Fever.  How is this diagnosed?  This condition may be diagnosed with tests, including:  Blood tests.  Urine tests.  Imaging tests.  A test in which a sample of tissue is removed from the kidneys to be examined under a microscope (kidney biopsy).  How is this treated?  Treatment for this condition depends on the cause and how severe the condition is. In mild cases, treatment may not be needed. The kidneys may heal on their own. In more severe cases, treatment will involve:  Treating the cause of the kidney injury. This may involve changing any medicines you are taking or adjusting your dosage.  Fluids. You may need specialized IV fluids to balance your body's needs.  Having a catheter placed to drain urine and prevent blockages.  Preventing problems from occurring. This may mean avoiding certain medicines or procedures that can cause further injury to  the kidneys.  In some cases treatment may also require:  A procedure to remove toxic wastes from the body (dialysis or continuous renal replacement therapy - CRRT).  Surgery. This may be done to repair a torn kidney, or to remove the blockage from the urinary system.  Follow these instructions at home:  Medicines  Take over-the-counter and prescription medicines only as told by your health care provider.  Do not take any new medicines without your health care provider's approval. Many medicines can worsen your kidney damage.  Do not take any vitamin and mineral supplements without your health care provider's approval. Many nutritional supplements can worsen your kidney damage.  Lifestyle  If your health care provider prescribed changes to your diet, follow them. You may need to decrease the amount of protein you eat.  Achieve and maintain a healthy weight. If you need help with this, ask your health care provider.  Start or continue an exercise plan. Try to exercise at least 30 minutes a day, 5 days a week.  Do not use any tobacco products, such as cigarettes, chewing tobacco, and e-cigarettes. If you need help quitting, ask your health care provider.  General instructions  Keep track of your blood pressure. Report changes in your blood pressure as told by your health care provider.  Stay up to date with immunizations. Ask your health care provider which immunizations you need.  Keep all follow-up visits as told by your health care provider. This is important.  Where to find more information  American Association of Kidney Patients: www.aakp.org  National Kidney Foundation: www.kidney.org  American Kidney Fund: www.akfinc.org  Life Options [facility]:  www.lifeoptions.org  www.kidneyschool.org  Contact a health care provider if:  Your symptoms get worse.  You develop new symptoms.  Get help right away if:  You develop symptoms of worsening kidney disease, which include:  Headaches.  Abnormally dark or  light skin.  Easy bruising.  Frequent hiccups.  Chest pain.  Shortness of breath.  End of menstruation in women.  Seizures.  Confusion or altered mental status.  Abdominal or back pain.  Itchiness.  You have a fever.  Your body is producing less urine.  You have pain or bleeding when you urinate.  Summary  Acute kidney injury is a sudden worsening of kidney function.  Acute kidney injury can be caused by problems with blood flow to the kidneys, direct damage to the kidneys, and sudden blockage of urine flow.  Symptoms of this condition may not be obvious until it becomes severe. Symptoms may include edema, lethargy, confusion, nausea or vomiting, and problems passing urine.  This condition can usually be diagnosed with blood tests, urine tests, and imaging tests. Sometimes a kidney biopsy is done to diagnose this condition.  Treatment for this condition often involves treating the underlying cause. It is treated with fluids, medicines, dialysis, diet changes, or surgery.  This information is not intended to replace advice given to you by your health care provider. Make sure you discuss any questions you have with your health care provider.  Document Released: 07/02/2012 Document Revised: 11/30/2018 Document Reviewed: 12/08/2017  [employer] Patient Education © 2020 [employer]    Discharge Instructions    Discharged to home by car with relative. Discharged via walking, hospital escort: Yes.  Special equipment needed: Not Applicable    Be sure to schedule a follow-up appointment with your primary care doctor or any specialists as instructed.     Discharge Plan:        I understand that a diet low in cholesterol, fat, and sodium is recommended for good health. Unless I have been given specific instructions below for another diet, I accept this instruction as my diet prescription.   Other diet:     Special Instructions: None    Is patient discharged on Warfarin / Coumadin?   No     Depression / Suicide Risk    As you  are discharged from this [facility] facility, it is important to learn how to keep safe from harming yourself.    Recognize the warning signs:  Abrupt changes in personality, positive or negative- including increase in energy   Giving away possessions  Change in eating patterns- significant weight changes-  positive or negative  Change in sleeping patterns- unable to sleep or sleeping all the time   Unwillingness or inability to communicate  Depression  Unusual sadness, discouragement and loneliness  Talk of wanting to die  Neglect of personal appearance   Rebelliousness- reckless behavior  Withdrawal from people/activities they love  Confusion- inability to concentrate     If you or a loved one observes any of these behaviors or has concerns about self-harm, here's what you can do:  Talk about it- your feelings and reasons for harming yourself  Remove any means that you might use to hurt yourself (examples: pills, rope, extension cords, firearm)  Get professional help from the community (Mental Health, Substance Abuse, psychological counseling)  Do not be alone:Call your Safe Contact- someone whom you trust who will be there for you.  Call your local CRISIS HOTLINE 362-3915 or [phone]  Call your local Children's Mobile Crisis Response Team [place] (739) 998-2201 or www.[employer]  Call the toll free National Suicide Prevention Hotlines   National Suicide Prevention Lifeline 607-070-DHIB (7923)  National Hope Line Network 800-SUICIDE (780-5472)

## 2022-05-24 NOTE — DISCHARGE SUMMARY
"Discharge Summary    CHIEF COMPLAINT ON ADMISSION  Chief Complaint   Patient presents with   • Seizure       Reason for Admission  EMS - needs room 3     Admission Date  5/18/2022    CODE STATUS  Full Code    HPI & HOSPITAL COURSE  Florentino Ny is a 32 y.o. male with a past medical history of epilepsy (previously well controlled per patient last seizure in 2013 ) on Keppra who presented 5/18/2022 after his partner found him unresponsive in the bathroom.  EMS was called and patient was brought to the emergency room for further evaluation.  During transport patient had a tonic-clonic seizure lasting approximately 1 minute.  He had been complaining of right shoulder pain and while being assessed for this he had a recurrent seizure for which she was given 2 mg of Ativan.  Following administration of this patient became cyanotic and had a PEA arrest with 1 round of epinephrine and 2 minutes of CPR with ROSC patient was intubated and admitted to the ICU for further care.  Initial labs were significant for HCO3 of 8 and leukocytosis of 35 and lactic of 7.5.      Due to elevated lactic acid,  CT abdomen/pelvis was done and was negative for abnormalities. CT head negative for bleed or other acute pathology.  Lumbar puncture was done and fluid analysis was not suggestive of infection.  CT of right shoulder showed dislocation with comminuted reve[place] Sachs lesion with multiple fracture fragments in the joint space. Also \"Associated glenohumeral joint effusion with tiny focus of gas of uncertain source.\" Dr. Goel from Orthopedics reduced the right shoulder and recommended sling with immobility x2 to 3 weeks.   Patient was seen by neurology, EEG was done which was abnormal suggestive of diffuse cerebral dysfunction likely due to sedation however he did have some cortical irritability.   Vimpat was initially added but later discontinued, and he was resumed on Keppra per patient he was only taking 750 twice a day " instead of the recommended 1500 mg twice a day.   Patient was monitored in the ICU for 48 hours, he was extubated and once it is noted that patient had no longer had any seizures patient was transferred to telemetry.  Patient did have evidence of  rhabdomyolysis that was treated with IV fluids. Patient remained in hospital for several days due to severe rhabdomyolysis, although without any renal dysfunction.   Patient's CPK did show rapid improvement trending downward. At the time of discharge patient is alert and oriented he has been seizure-free and feels well.  He denies any significant complaints.  He is urinating without issue.  Given his rapid improvement of CPK over the last 24 hours suspect that this will continue to improve therefore I feel it safe for patient to discharge as he has no renal impairment or other symptoms.  He was educated on importance of adequate hydration.  Labs were ordered in approximately 1 week for repeat basic metabolic panel and CPK to ensure resolution.    Patient was educated on signs and symptoms that should prompt him to seek medical attention.  Patient was alerted that he is unable to drive or operate heavy machinery DMV paperwork was completed by previous physician.    Therefore, he is discharged in good and stable condition to home with close outpatient follow-up.    The patient met 2-midnight criteria for an inpatient stay at the time of discharge.    Discharge Date  5/24/2022    FOLLOW UP ITEMS POST DISCHARGE  Follow-up with epilepsy clinic on monitoring and possible medication adjustment  Repeat basic metabolic panel and CPK to ensure resolution  Orthopedic follow-up for right shoulder dislocation    DISCHARGE DIAGNOSES  Principal Problem (Resolved):    Status epilepticus (HCC) POA: Yes  Active Problems:    Epilepsy (HCC) POA: Yes      Overview: ICD-10 transition    Dislocation of right shoulder joint POA: Yes    Lactic acidosis POA: Yes    Rhabdomyolysis POA: Yes    MINNA  (acute kidney injury) (HCC) POA: Yes  Resolved Problems:    Cardiac arrest (HCC) POA: Yes    Leukocytosis (leucocytosis) POA: Unknown    History of mechanical ventilation POA: Unknown    Acute respiratory failure with hypoxia (HCC) POA: Yes      FOLLOW UP  No future appointments.  [facility]  [address]  [place] [address] [phone]  Call  call to  make appointment for epilepsy clinic    Ferdinand Goel M.D.  [address]  [place] [address] [phone]    Follow up in 1 month(s)  shoulder dislocation follow up, sooner if issues      MEDICATIONS ON DISCHARGE     Medication List      START taking these medications      Instructions   acetaminophen 325 MG Tabs  Commonly known as: Tylenol   Take 2 Tablets by mouth every four hours as needed for Fever.  Dose: 650 mg        CONTINUE taking these medications      Instructions   CALCIUM 1200 PO   Take 1 Tablet by mouth every day.  Dose: 1 Tablet     Keppra  MG Tb24  Generic drug: Levetiracetam   Take 1,500 mg by mouth 2 times a day.  Dose: 1,500 mg     multivitamin Tabs   Take 1 Tab by mouth every day.  Dose: 1 Tablet            Allergies  No Known Allergies    DIET  Orders Placed This Encounter   Procedures   • Diet Order Diet: Regular     Standing Status:   Standing     Number of Occurrences:   1     Order Specific Question:   Diet:     Answer:   Regular [1]       ACTIVITY  As tolerated.  Do not lift RIGHT arm above the shoulder    CONSULTATIONS  Orthopedic surgery-Dr. Goel with [facility]  -Neurology Dr. Garcia  Critical care    PROCEDURES  Closed reduction under anesthesia for right posterior shoulder dislocation 5/18/2022  EEG on 5/18/2022  Lumbar puncture on 5/18/2022      LABORATORY  Lab Results   Component Value Date    SODIUM 139 05/24/2022    POTASSIUM 3.8 05/24/2022    CHLORIDE 103 05/24/2022    CO2 25 05/24/2022    GLUCOSE 97 05/24/2022    BUN 8 05/24/2022    CREATININE 0.82 05/24/2022        Lab Results   Component  Value Date    WBC 9.1 05/20/2022    HEMOGLOBIN 11.5 (L) 05/20/2022    HEMATOCRIT 33.1 (L) 05/20/2022    PLATELETCT 176 05/20/2022        Total time of the discharge process exceeds 40 minutes.

## 2022-05-24 NOTE — PROGRESS NOTES
Per patient request labs were changed to be drawn at 0600 in the morning tomorrow to help promote sleep.

## 2022-05-24 NOTE — PROGRESS NOTES
Received Bedside report. Assumed care at 1900. This pt is AOx4, ambulatory with no assistance required, voiding aequately, 0/10 pain. Patient and RN discussed plan of care: questions answered. Labs noted, assessment complete, patient tolerating regular diet. Tele box in place. Pt is on room air. Call light in place, fall precautions in place, patient educated on importance of calling for assistance. No additional needs at this time. VSS

## 2022-06-04 PROBLEM — J30.9 ALLERGIC RHINITIS: Status: ACTIVE | Noted: 2021-04-08

## 2022-06-04 PROBLEM — Z86.69 HX OF BELL'S PALSY: Status: ACTIVE | Noted: 2020-03-03

## 2022-06-04 PROBLEM — M62.82 RHABDOMYOLYSIS: Status: RESOLVED | Noted: 2022-05-19 | Resolved: 2022-06-04

## 2022-06-04 PROBLEM — E87.20 LACTIC ACIDOSIS: Status: RESOLVED | Noted: 2022-05-18 | Resolved: 2022-06-04

## 2022-06-04 PROBLEM — N17.9 AKI (ACUTE KIDNEY INJURY) (HCC): Status: RESOLVED | Noted: 2022-05-19 | Resolved: 2022-06-04

## 2022-06-14 ENCOUNTER — APPOINTMENT (OUTPATIENT)
Dept: INTERNAL MEDICINE | Facility: OTHER | Age: 32
End: 2022-06-14
Payer: COMMERCIAL

## 2023-10-19 NOTE — PROGRESS NOTES
Patient transferred from ICU into room 727 on Tele 7. Arrived via wheelchair. Accompanied by nursing staff and visitor. Belongings with patient. Telemetry re-applied. Vital signs taken. LR resumed. No concerns at this time.    Plan: OTC Z-Bar or DermaHarmony Zinc Bar - wash face/ears twice daily (available on Amazon)\\nHydrocortisone 2.5% cream - apply twice daily as needed for facial/ear scaling (flares)\\nCiclopirox cream apply twice daily for facial redness and scaling (maintenance therapy) \\nKetoconazole shampoo 2-3 times weekly, rotate with OTC tea tree shampoo Render In Strict Bullet Format?: No Detail Level: Zone Plan: Use Cetaphil Restoraderm body wash for cleansing\\nApply Neutrogena hydroboost body gel cream fragrance free twice daily (for dryness), especially within 3 minutes of getting out of bath or shower\\nApply Triamcinolone twice daily as needed for flares (avoid face, armpits, groin - limit to two weeks in a row to one area)\\nAvoid scratching/rubbing affected areas\\nOTC Sarna lotion for sensitive skin as needed for itching\\nCall office if doesn't improve or persists Plan: Chlorhexidine (generic Hibiclens) - wash affected areas daily in shower (avoid eye/ear exposure)\\nClindamycin solution - apply twice daily as needed for breakouts